# Patient Record
Sex: MALE | Race: OTHER | HISPANIC OR LATINO
[De-identification: names, ages, dates, MRNs, and addresses within clinical notes are randomized per-mention and may not be internally consistent; named-entity substitution may affect disease eponyms.]

---

## 2017-10-31 ENCOUNTER — APPOINTMENT (OUTPATIENT)
Dept: INTERNAL MEDICINE | Facility: CLINIC | Age: 42
End: 2017-10-31

## 2017-11-15 ENCOUNTER — OUTPATIENT (OUTPATIENT)
Dept: OUTPATIENT SERVICES | Facility: HOSPITAL | Age: 42
LOS: 1 days | End: 2017-11-15
Payer: COMMERCIAL

## 2017-11-15 ENCOUNTER — APPOINTMENT (OUTPATIENT)
Dept: INTERNAL MEDICINE | Facility: CLINIC | Age: 42
End: 2017-11-15
Payer: MEDICAID

## 2017-11-15 ENCOUNTER — LABORATORY RESULT (OUTPATIENT)
Age: 42
End: 2017-11-15

## 2017-11-15 VITALS
WEIGHT: 167 LBS | OXYGEN SATURATION: 97 % | SYSTOLIC BLOOD PRESSURE: 130 MMHG | HEART RATE: 84 BPM | DIASTOLIC BLOOD PRESSURE: 80 MMHG

## 2017-11-15 DIAGNOSIS — Z82.49 FAMILY HISTORY OF ISCHEMIC HEART DISEASE AND OTHER DISEASES OF THE CIRCULATORY SYSTEM: ICD-10-CM

## 2017-11-15 DIAGNOSIS — I10 ESSENTIAL (PRIMARY) HYPERTENSION: ICD-10-CM

## 2017-11-15 DIAGNOSIS — Z83.3 FAMILY HISTORY OF DIABETES MELLITUS: ICD-10-CM

## 2017-11-15 LAB
HCT VFR BLD CALC: 45.3 % — SIGNIFICANT CHANGE UP (ref 39–50)
HGB BLD-MCNC: 14.7 G/DL — SIGNIFICANT CHANGE UP (ref 13–17)
MCHC RBC-ENTMCNC: 28.4 PG — SIGNIFICANT CHANGE UP (ref 27–34)
MCHC RBC-ENTMCNC: 32.5 GM/DL — SIGNIFICANT CHANGE UP (ref 32–36)
MCV RBC AUTO: 87.5 FL — SIGNIFICANT CHANGE UP (ref 80–100)
PLATELET # BLD AUTO: 233 K/UL — SIGNIFICANT CHANGE UP (ref 150–400)
RBC # BLD: 5.18 M/UL — SIGNIFICANT CHANGE UP (ref 4.2–5.8)
RBC # FLD: 14 % — SIGNIFICANT CHANGE UP (ref 10.3–14.5)
WBC # BLD: 7.25 K/UL — SIGNIFICANT CHANGE UP (ref 3.8–10.5)
WBC # FLD AUTO: 7.25 K/UL — SIGNIFICANT CHANGE UP (ref 3.8–10.5)

## 2017-11-15 PROCEDURE — G0463: CPT

## 2017-11-15 PROCEDURE — 83036 HEMOGLOBIN GLYCOSYLATED A1C: CPT

## 2017-11-15 PROCEDURE — 85027 COMPLETE CBC AUTOMATED: CPT

## 2017-11-15 PROCEDURE — 86765 RUBEOLA ANTIBODY: CPT

## 2017-11-15 PROCEDURE — 99203 OFFICE O/P NEW LOW 30 MIN: CPT | Mod: GE

## 2017-11-15 PROCEDURE — 86735 MUMPS ANTIBODY: CPT

## 2017-11-15 PROCEDURE — 90688 IIV4 VACCINE SPLT 0.5 ML IM: CPT

## 2017-11-15 PROCEDURE — 86762 RUBELLA ANTIBODY: CPT

## 2017-11-15 PROCEDURE — 80061 LIPID PANEL: CPT

## 2017-11-15 PROCEDURE — 83520 IMMUNOASSAY QUANT NOS NONAB: CPT

## 2017-11-15 PROCEDURE — 80053 COMPREHEN METABOLIC PANEL: CPT

## 2017-11-15 PROCEDURE — G0008: CPT

## 2017-11-16 LAB
ALBUMIN SERPL ELPH-MCNC: 4.8 G/DL — SIGNIFICANT CHANGE UP (ref 3.3–5)
ALP SERPL-CCNC: 65 U/L — SIGNIFICANT CHANGE UP (ref 40–120)
ALT FLD-CCNC: 18 U/L — SIGNIFICANT CHANGE UP (ref 10–45)
ANION GAP SERPL CALC-SCNC: 15 MMOL/L — SIGNIFICANT CHANGE UP (ref 5–17)
AST SERPL-CCNC: 21 U/L — SIGNIFICANT CHANGE UP (ref 10–40)
BILIRUB SERPL-MCNC: 0.6 MG/DL — SIGNIFICANT CHANGE UP (ref 0.2–1.2)
BUN SERPL-MCNC: 19 MG/DL — SIGNIFICANT CHANGE UP (ref 7–23)
CALCIUM SERPL-MCNC: 9.4 MG/DL — SIGNIFICANT CHANGE UP (ref 8.4–10.5)
CHLORIDE SERPL-SCNC: 103 MMOL/L — SIGNIFICANT CHANGE UP (ref 96–108)
CHOLEST SERPL-MCNC: 175 MG/DL — SIGNIFICANT CHANGE UP (ref 10–199)
CO2 SERPL-SCNC: 25 MMOL/L — SIGNIFICANT CHANGE UP (ref 22–31)
CREAT SERPL-MCNC: 0.79 MG/DL — SIGNIFICANT CHANGE UP (ref 0.5–1.3)
GLUCOSE SERPL-MCNC: 92 MG/DL — SIGNIFICANT CHANGE UP (ref 70–99)
HBA1C BLD-MCNC: 5.4 % — SIGNIFICANT CHANGE UP (ref 4–5.6)
HDLC SERPL-MCNC: 38 MG/DL — LOW (ref 40–125)
LIPID PNL WITH DIRECT LDL SERPL: 121 MG/DL — SIGNIFICANT CHANGE UP
MEV IGG SER-ACNC: 22 AU/ML — SIGNIFICANT CHANGE UP
MEV IGG+IGM SER-IMP: NEGATIVE — SIGNIFICANT CHANGE UP
MUV AB SER-ACNC: POSITIVE — SIGNIFICANT CHANGE UP
MUV IGG FLD-ACNC: 134 AU/ML — SIGNIFICANT CHANGE UP
POTASSIUM SERPL-MCNC: 4.4 MMOL/L — SIGNIFICANT CHANGE UP (ref 3.5–5.3)
POTASSIUM SERPL-SCNC: 4.4 MMOL/L — SIGNIFICANT CHANGE UP (ref 3.5–5.3)
PROT SERPL-MCNC: 7.7 G/DL — SIGNIFICANT CHANGE UP (ref 6–8.3)
RUBV IGG SER-ACNC: 12.4 INDEX — SIGNIFICANT CHANGE UP
RUBV IGG SER-IMP: POSITIVE — SIGNIFICANT CHANGE UP
SODIUM SERPL-SCNC: 143 MMOL/L — SIGNIFICANT CHANGE UP (ref 135–145)
TOTAL CHOLESTEROL/HDL RATIO MEASUREMENT: 4.6 RATIO — SIGNIFICANT CHANGE UP (ref 3.4–9.6)
TRIGL SERPL-MCNC: 81 MG/DL — SIGNIFICANT CHANGE UP (ref 10–149)

## 2017-11-17 LAB — TSH RECEP AB FLD-ACNC: <0.5 IU/L — SIGNIFICANT CHANGE UP (ref 0–1.75)

## 2017-11-22 DIAGNOSIS — I49.5 SICK SINUS SYNDROME: ICD-10-CM

## 2017-11-22 DIAGNOSIS — Z82.49 FAMILY HISTORY OF ISCHEMIC HEART DISEASE AND OTHER DISEASES OF THE CIRCULATORY SYSTEM: ICD-10-CM

## 2017-11-22 DIAGNOSIS — Z23 ENCOUNTER FOR IMMUNIZATION: ICD-10-CM

## 2017-11-22 DIAGNOSIS — F84.0 AUTISTIC DISORDER: ICD-10-CM

## 2017-11-22 DIAGNOSIS — Z83.3 FAMILY HISTORY OF DIABETES MELLITUS: ICD-10-CM

## 2018-01-11 ENCOUNTER — APPOINTMENT (OUTPATIENT)
Dept: INTERNAL MEDICINE | Facility: CLINIC | Age: 43
End: 2018-01-11
Payer: MEDICAID

## 2018-01-11 VITALS
DIASTOLIC BLOOD PRESSURE: 80 MMHG | OXYGEN SATURATION: 98 % | HEART RATE: 95 BPM | WEIGHT: 164 LBS | SYSTOLIC BLOOD PRESSURE: 110 MMHG

## 2018-01-11 PROCEDURE — 99213 OFFICE O/P EST LOW 20 MIN: CPT | Mod: GE

## 2018-01-17 ENCOUNTER — NON-APPOINTMENT (OUTPATIENT)
Age: 43
End: 2018-01-17

## 2018-01-17 ENCOUNTER — APPOINTMENT (OUTPATIENT)
Dept: ELECTROPHYSIOLOGY | Facility: CLINIC | Age: 43
End: 2018-01-17
Payer: MEDICAID

## 2018-01-17 ENCOUNTER — OUTPATIENT (OUTPATIENT)
Dept: OUTPATIENT SERVICES | Facility: HOSPITAL | Age: 43
LOS: 1 days | End: 2018-01-17
Payer: COMMERCIAL

## 2018-01-17 ENCOUNTER — APPOINTMENT (OUTPATIENT)
Dept: CARDIOLOGY | Facility: HOSPITAL | Age: 43
End: 2018-01-17

## 2018-01-17 VITALS
OXYGEN SATURATION: 97 % | DIASTOLIC BLOOD PRESSURE: 78 MMHG | SYSTOLIC BLOOD PRESSURE: 107 MMHG | HEART RATE: 80 BPM | WEIGHT: 164 LBS

## 2018-01-17 DIAGNOSIS — I25.10 ATHEROSCLEROTIC HEART DISEASE OF NATIVE CORONARY ARTERY WITHOUT ANGINA PECTORIS: ICD-10-CM

## 2018-01-17 DIAGNOSIS — Z00.00 ENCOUNTER FOR GENERAL ADULT MEDICAL EXAMINATION WITHOUT ABNORMAL FINDINGS: ICD-10-CM

## 2018-01-17 PROCEDURE — 93279 PRGRMG DEV EVAL PM/LDLS PM: CPT

## 2018-01-17 PROCEDURE — G0463: CPT

## 2018-01-17 PROCEDURE — 93005 ELECTROCARDIOGRAM TRACING: CPT

## 2018-01-18 DIAGNOSIS — I49.9 CARDIAC ARRHYTHMIA, UNSPECIFIED: ICD-10-CM

## 2018-01-18 LAB
BASOPHILS # BLD AUTO: 0.04 K/UL
BASOPHILS NFR BLD AUTO: 0.5 %
CHOLEST SERPL-MCNC: 182 MG/DL
CHOLEST/HDLC SERPL: 5.1 RATIO
EOSINOPHIL # BLD AUTO: 0.1 K/UL
EOSINOPHIL NFR BLD AUTO: 1.2 %
HBA1C MFR BLD HPLC: 5.5 %
HCT VFR BLD CALC: 44.9 %
HDLC SERPL-MCNC: 36 MG/DL
HGB BLD-MCNC: 14.5 G/DL
IMM GRANULOCYTES NFR BLD AUTO: 0.2 %
LDLC SERPL CALC-MCNC: 124 MG/DL
LYMPHOCYTES # BLD AUTO: 1.69 K/UL
LYMPHOCYTES NFR BLD AUTO: 20.6 %
MAN DIFF?: NORMAL
MCHC RBC-ENTMCNC: 27.9 PG
MCHC RBC-ENTMCNC: 32.3 GM/DL
MCV RBC AUTO: 86.3 FL
MONOCYTES # BLD AUTO: 0.52 K/UL
MONOCYTES NFR BLD AUTO: 6.3 %
NEUTROPHILS # BLD AUTO: 5.82 K/UL
NEUTROPHILS NFR BLD AUTO: 71.2 %
PLATELET # BLD AUTO: 333 K/UL
RBC # BLD: 5.2 M/UL
RBC # FLD: 13.6 %
TRIGL SERPL-MCNC: 110 MG/DL
WBC # FLD AUTO: 8.19 K/UL

## 2018-01-26 ENCOUNTER — OUTPATIENT (OUTPATIENT)
Dept: OUTPATIENT SERVICES | Facility: HOSPITAL | Age: 43
LOS: 1 days | End: 2018-01-26
Payer: COMMERCIAL

## 2018-01-26 ENCOUNTER — APPOINTMENT (OUTPATIENT)
Dept: CV DIAGNOSITCS | Facility: HOSPITAL | Age: 43
End: 2018-01-26

## 2018-01-26 DIAGNOSIS — Z00.00 ENCOUNTER FOR GENERAL ADULT MEDICAL EXAMINATION WITHOUT ABNORMAL FINDINGS: ICD-10-CM

## 2018-01-26 PROCEDURE — 93306 TTE W/DOPPLER COMPLETE: CPT | Mod: 26

## 2018-01-26 PROCEDURE — 93306 TTE W/DOPPLER COMPLETE: CPT

## 2018-02-14 ENCOUNTER — RESULT REVIEW (OUTPATIENT)
Age: 43
End: 2018-02-14

## 2018-02-14 LAB
ALBUMIN SERPL ELPH-MCNC: 4.5 G/DL
ALP BLD-CCNC: 65 U/L
ALT SERPL-CCNC: 18 U/L
ANION GAP SERPL CALC-SCNC: 14 MMOL/L
AST SERPL-CCNC: 24 U/L
BILIRUB SERPL-MCNC: 0.5 MG/DL
BUN SERPL-MCNC: 17 MG/DL
CALCIUM SERPL-MCNC: 9.7 MG/DL
CHLORIDE SERPL-SCNC: 101 MMOL/L
CO2 SERPL-SCNC: 26 MMOL/L
CREAT SERPL-MCNC: 1.11 MG/DL
GLUCOSE SERPL-MCNC: 105 MG/DL
POTASSIUM SERPL-SCNC: 3.9 MMOL/L
PROT SERPL-MCNC: 7.8 G/DL
SODIUM SERPL-SCNC: 141 MMOL/L

## 2018-04-11 ENCOUNTER — OUTPATIENT (OUTPATIENT)
Dept: OUTPATIENT SERVICES | Facility: HOSPITAL | Age: 43
LOS: 1 days | End: 2018-04-11
Payer: COMMERCIAL

## 2018-04-11 ENCOUNTER — APPOINTMENT (OUTPATIENT)
Dept: ELECTROPHYSIOLOGY | Facility: CLINIC | Age: 43
End: 2018-04-11

## 2018-04-11 ENCOUNTER — APPOINTMENT (OUTPATIENT)
Dept: CARDIOLOGY | Facility: HOSPITAL | Age: 43
End: 2018-04-11

## 2018-04-11 VITALS — DIASTOLIC BLOOD PRESSURE: 72 MMHG | OXYGEN SATURATION: 98 % | HEART RATE: 71 BPM | SYSTOLIC BLOOD PRESSURE: 111 MMHG

## 2018-04-11 DIAGNOSIS — I25.10 ATHEROSCLEROTIC HEART DISEASE OF NATIVE CORONARY ARTERY WITHOUT ANGINA PECTORIS: ICD-10-CM

## 2018-04-11 PROCEDURE — G0463: CPT

## 2018-04-12 DIAGNOSIS — I49.5 SICK SINUS SYNDROME: ICD-10-CM

## 2018-04-18 ENCOUNTER — APPOINTMENT (OUTPATIENT)
Dept: ELECTROPHYSIOLOGY | Facility: CLINIC | Age: 43
End: 2018-04-18
Payer: COMMERCIAL

## 2018-04-18 ENCOUNTER — NON-APPOINTMENT (OUTPATIENT)
Age: 43
End: 2018-04-18

## 2018-04-18 VITALS — HEART RATE: 84 BPM | SYSTOLIC BLOOD PRESSURE: 111 MMHG | OXYGEN SATURATION: 99 % | DIASTOLIC BLOOD PRESSURE: 68 MMHG

## 2018-04-18 PROCEDURE — 99203 OFFICE O/P NEW LOW 30 MIN: CPT

## 2018-04-18 PROCEDURE — 93000 ELECTROCARDIOGRAM COMPLETE: CPT

## 2018-04-18 RX ORDER — BENZONATATE 100 MG/1
100 CAPSULE ORAL
Qty: 30 | Refills: 0 | Status: DISCONTINUED | COMMUNITY
Start: 2018-01-11 | End: 2018-04-18

## 2018-04-24 ENCOUNTER — APPOINTMENT (OUTPATIENT)
Dept: OPHTHALMOLOGY | Facility: CLINIC | Age: 43
End: 2018-04-24
Payer: COMMERCIAL

## 2018-04-24 DIAGNOSIS — H11.003 UNSPECIFIED PTERYGIUM OF EYE, BILATERAL: ICD-10-CM

## 2018-04-24 PROCEDURE — 92285 EXTERNAL OCULAR PHOTOGRAPHY: CPT

## 2018-04-24 PROCEDURE — 99204 OFFICE O/P NEW MOD 45 MIN: CPT

## 2018-05-17 ENCOUNTER — MEDICATION RENEWAL (OUTPATIENT)
Age: 43
End: 2018-05-17

## 2018-05-18 ENCOUNTER — NON-APPOINTMENT (OUTPATIENT)
Age: 43
End: 2018-05-18

## 2018-05-18 ENCOUNTER — APPOINTMENT (OUTPATIENT)
Dept: ELECTROPHYSIOLOGY | Facility: CLINIC | Age: 43
End: 2018-05-18
Payer: COMMERCIAL

## 2018-05-18 VITALS — SYSTOLIC BLOOD PRESSURE: 112 MMHG | OXYGEN SATURATION: 100 % | HEART RATE: 76 BPM | DIASTOLIC BLOOD PRESSURE: 71 MMHG

## 2018-05-18 PROCEDURE — 93000 ELECTROCARDIOGRAM COMPLETE: CPT | Mod: 59

## 2018-05-18 PROCEDURE — 99213 OFFICE O/P EST LOW 20 MIN: CPT

## 2018-05-18 PROCEDURE — 93279 PRGRMG DEV EVAL PM/LDLS PM: CPT

## 2018-05-18 RX ORDER — PROPAFENONE HYDROCHLORIDE 150 MG/1
150 TABLET, FILM COATED ORAL DAILY
Qty: 30 | Refills: 0 | Status: DISCONTINUED | COMMUNITY
Start: 2017-11-15 | End: 2018-05-18

## 2018-11-14 ENCOUNTER — NON-APPOINTMENT (OUTPATIENT)
Age: 43
End: 2018-11-14

## 2018-11-14 ENCOUNTER — OUTPATIENT (OUTPATIENT)
Dept: OUTPATIENT SERVICES | Facility: HOSPITAL | Age: 43
LOS: 1 days | End: 2018-11-14
Payer: COMMERCIAL

## 2018-11-14 ENCOUNTER — APPOINTMENT (OUTPATIENT)
Dept: CARDIOLOGY | Facility: HOSPITAL | Age: 43
End: 2018-11-14

## 2018-11-14 VITALS
WEIGHT: 163.14 LBS | DIASTOLIC BLOOD PRESSURE: 74 MMHG | HEART RATE: 70 BPM | OXYGEN SATURATION: 97 % | SYSTOLIC BLOOD PRESSURE: 116 MMHG

## 2018-11-14 DIAGNOSIS — R00.2 PALPITATIONS: ICD-10-CM

## 2018-11-14 DIAGNOSIS — I25.10 ATHEROSCLEROTIC HEART DISEASE OF NATIVE CORONARY ARTERY WITHOUT ANGINA PECTORIS: ICD-10-CM

## 2018-11-14 PROCEDURE — 93005 ELECTROCARDIOGRAM TRACING: CPT

## 2018-11-14 PROCEDURE — G0463: CPT

## 2018-11-14 NOTE — PHYSICAL EXAM
[General Appearance - Well Developed] : well developed [General Appearance - In No Acute Distress] : no acute distress [Normal Conjunctiva] : the conjunctiva exhibited no abnormalities [Normal Oral Mucosa] : normal oral mucosa [No Oral Pallor] : no oral pallor [Normal Jugular Venous A Waves Present] : normal jugular venous A waves present [Normal Jugular Venous V Waves Present] : normal jugular venous V waves present [No Jugular Venous Arias A Waves] : no jugular venous arias A waves [Auscultation Breath Sounds / Voice Sounds] : lungs were clear to auscultation bilaterally [Heart Rate And Rhythm] : heart rate and rhythm were normal [Heart Sounds] : normal S1 and S2 [Murmurs] : no murmurs present [Bowel Sounds] : normal bowel sounds [Abdomen Tenderness] : non-tender [Abnormal Walk] : normal gait [] : no rash

## 2018-11-15 DIAGNOSIS — I49.5 SICK SINUS SYNDROME: ICD-10-CM

## 2018-11-15 DIAGNOSIS — R00.2 PALPITATIONS: ICD-10-CM

## 2018-11-15 NOTE — DISCUSSION/SUMMARY
[FreeTextEntry1] : 41 yo man w/ hx of SSS s/p single lead PPM 2009 (biotronik, in [facility]), autism/mental retardation (since birth) who presents for follow up. Seen by Dr. Ramos (EP attending), and placed on BB therapy w/ improvement of palpitations.  TTE on 1/2018 w/ preserved LV function; Holter w/ no evidence of arrhythmias, no hx of afib as per family. \par \par PPM interrogated today:  Pt RV pacing 35%, good thresholds/impedance/sensitivity.  Battery LARY 11 months. \par \par Plan\par - cont Toprol XL 12.5 mg daily \par - obtain TTE in 3 months to re-evaluate LV function \par - repeat PPM interrogation in 3-6 months \par \par RTC in 3 months w/ TTE results

## 2018-11-15 NOTE — HISTORY OF PRESENT ILLNESS
[FreeTextEntry1] : 43 yo man w/ hx of SSS s/p PPM 2009 (back in [facility]), autism/mental retardation (since birth) who presents for follow up. History primarily obtained from sister as patient does not speak. \par \par Patient had tachybrady syndrome requiring PPM in 2009.  Amber unclear reasons, received a single lead ppm (biotronik).  Referred to me on 1/2018 for initial evaluation.  Underwent PPM interrogation and found to be in sinus w/ V-pacing ~ 43% of the time.  TTE performed w/ preserved LV function.  Holter monitor w/ no evidence of arrhythmias.  Seen by Dr. Ramos (EP attending), placed on BB therapy and VVI back up rate decreased to 50 bpm.     \par \par Palpitations since then have resolved.  No other new/acute complaints.

## 2018-11-16 ENCOUNTER — APPOINTMENT (OUTPATIENT)
Dept: INTERNAL MEDICINE | Facility: CLINIC | Age: 43
End: 2018-11-16

## 2018-11-16 ENCOUNTER — APPOINTMENT (OUTPATIENT)
Dept: ELECTROPHYSIOLOGY | Facility: CLINIC | Age: 43
End: 2018-11-16

## 2019-01-14 ENCOUNTER — LABORATORY RESULT (OUTPATIENT)
Age: 44
End: 2019-01-14

## 2019-01-14 ENCOUNTER — OUTPATIENT (OUTPATIENT)
Dept: OUTPATIENT SERVICES | Facility: HOSPITAL | Age: 44
LOS: 1 days | End: 2019-01-14
Payer: COMMERCIAL

## 2019-01-14 ENCOUNTER — APPOINTMENT (OUTPATIENT)
Dept: INTERNAL MEDICINE | Facility: CLINIC | Age: 44
End: 2019-01-14
Payer: COMMERCIAL

## 2019-01-14 VITALS
SYSTOLIC BLOOD PRESSURE: 112 MMHG | WEIGHT: 160 LBS | BODY MASS INDEX: 25.71 KG/M2 | DIASTOLIC BLOOD PRESSURE: 70 MMHG | HEIGHT: 66 IN

## 2019-01-14 DIAGNOSIS — I10 ESSENTIAL (PRIMARY) HYPERTENSION: ICD-10-CM

## 2019-01-14 DIAGNOSIS — J06.9 ACUTE UPPER RESPIRATORY INFECTION, UNSPECIFIED: ICD-10-CM

## 2019-01-14 PROCEDURE — 90656 IIV3 VACC NO PRSV 0.5 ML IM: CPT

## 2019-01-14 PROCEDURE — 84443 ASSAY THYROID STIM HORMONE: CPT

## 2019-01-14 PROCEDURE — 99204 OFFICE O/P NEW MOD 45 MIN: CPT | Mod: GC

## 2019-01-14 PROCEDURE — G0008: CPT

## 2019-01-14 PROCEDURE — 99214 OFFICE O/P EST MOD 30 MIN: CPT | Mod: GC

## 2019-01-14 PROCEDURE — G0463: CPT

## 2019-01-14 PROCEDURE — 80048 BASIC METABOLIC PNL TOTAL CA: CPT

## 2019-01-14 NOTE — HEALTH RISK ASSESSMENT
[Good] : ~his/her~  mood as  good [No falls in past year] : Patient reported no falls in the past year [0] : 2) Feeling down, depressed, or hopeless: Not at all (0) [Language] : difficulty with language [Behavior] : difficulty with behavior [Learning/Retaining New Information] : difficulty learning/retaining new information [Handling Complex Tasks] : difficulty handling complex tasks [Reasoning] : difficulty with reasoning [Spatial Ability and Orientation] : difficulty with spatial ability and orientation [Less Than High School] : less than high school [] : No [[id number]] : 0

## 2019-01-14 NOTE — ASSESSMENT
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 \par \par ###Mental Disability: Genetics referral given. Patient has some features of autism. Sister reports that her mother was in labor with him for quite a while before she delivered-could have some degree of anoxic brain injury? Sister with MR as well. Marfanoid features as well. Genetic testing indicated. \par \par ###Cardiomyopathy s/p PPM: Unclear why he received PPM. Cardiology documented tachy/yolande syndrome. V paced 43% of the time. c/w current meds for BP\par \par ###HCM: TDAP in 2016. Influenza vaccine given in LUE. Routine labs ordered.\par Follow up prn\par \par PT seen and d/w Dr. Red

## 2019-01-14 NOTE — PHYSICAL EXAM
[No Acute Distress] : no acute distress [Well Nourished] : well nourished [Normal Sclera/Conjunctiva] : normal sclera/conjunctiva [PERRL] : pupils equal round and reactive to light [EOMI] : extraocular movements intact [Normal Outer Ear/Nose] : the outer ears and nose were normal in appearance [Normal Oropharynx] : the oropharynx was normal [No JVD] : no jugular venous distention [Supple] : supple [No Lymphadenopathy] : no lymphadenopathy [Thyroid Normal, No Nodules] : the thyroid was normal and there were no nodules present [No Respiratory Distress] : no respiratory distress  [Clear to Auscultation] : lungs were clear to auscultation bilaterally [No Accessory Muscle Use] : no accessory muscle use [Normal Rate] : normal rate  [Regular Rhythm] : with a regular rhythm [Normal S1, S2] : normal S1 and S2 [No Murmur] : no murmur heard [No Carotid Bruits] : no carotid bruits [No Abdominal Bruit] : a ~M bruit was not heard ~T in the abdomen [No Varicosities] : no varicosities [No Edema] : there was no peripheral edema [No Extremity Clubbing/Cyanosis] : no extremity clubbing/cyanosis [No Palpable Aorta] : no palpable aorta [Soft] : abdomen soft [Non Tender] : non-tender [Non-distended] : non-distended [No Masses] : no abdominal mass palpated [No HSM] : no HSM [Normal Bowel Sounds] : normal bowel sounds [No Hernias] : no hernias [Normal Supraclavicular Nodes] : no supraclavicular lymphadenopathy [Normal Axillary Nodes] : no axillary lymphadenopathy [Normal Posterior Cervical Nodes] : no posterior cervical lymphadenopathy [Normal Femoral Nodes] : no femoral lymphadenopathy [Normal Anterior Cervical Nodes] : no anterior cervical lymphadenopathy [Normal Inguinal Nodes] : no inguinal lymphadenopathy [No CVA Tenderness] : no CVA  tenderness [No Spinal Tenderness] : no spinal tenderness [No Joint Swelling] : no joint swelling [Grossly Normal Strength/Tone] : grossly normal strength/tone [No Rash] : no rash [Normal Gait] : normal gait [Coordination Grossly Intact] : coordination grossly intact [No Focal Deficits] : no focal deficits [Speech Grossly Normal] : speech grossly normal [Memory Grossly Normal] : memory grossly normal [Normal Affect] : the affect was normal [Normal Mood] : the mood was normal [Normal Insight/Judgement] : insight and judgment were intact [de-identified] : Chin retraction noted (small lower jaw) [de-identified] : Everted wrists and hands. Fingers disproportionately long compared to rest of body habitus

## 2019-01-14 NOTE — HISTORY OF PRESENT ILLNESS
[FreeTextEntry1] : CPE [de-identified] : Patient is a 43 years old male with PPM (for tachy yolande syndrome as per cardiology note?) and MR who presents to \[facility]\"" care. Patient is non verbal at baseline and is accompanied by his sister who provided history. Patient was born at term in [facility] but was born with facial deformity, everted wrists and feet, along with disproportionately large hands. He has another sister with similar issues who is short in stature. Sister's goal is to find out what condition her brother has as her was never definitively diagnosed in [facility]. He communicates with gestures and can make his needs known with visual cues.His hearing seems to be intact. He does not like changing his environment and does not get more than 2 hours of sleep at a time.

## 2019-01-15 LAB
ANION GAP SERPL CALC-SCNC: 13 MMOL/L — SIGNIFICANT CHANGE UP (ref 5–17)
BASOPHILS # BLD AUTO: 0.03 K/UL
BASOPHILS NFR BLD AUTO: 0.5 %
BUN SERPL-MCNC: 18 MG/DL — SIGNIFICANT CHANGE UP (ref 7–23)
CALCIUM SERPL-MCNC: 10.1 MG/DL — SIGNIFICANT CHANGE UP (ref 8.4–10.5)
CHLORIDE SERPL-SCNC: 104 MMOL/L — SIGNIFICANT CHANGE UP (ref 96–108)
CO2 SERPL-SCNC: 23 MMOL/L — SIGNIFICANT CHANGE UP (ref 22–31)
CREAT SERPL-MCNC: 0.94 MG/DL — SIGNIFICANT CHANGE UP (ref 0.5–1.3)
EOSINOPHIL # BLD AUTO: 0.16 K/UL
EOSINOPHIL NFR BLD AUTO: 2.5 %
GLUCOSE SERPL-MCNC: 91 MG/DL — SIGNIFICANT CHANGE UP (ref 70–99)
HCT VFR BLD CALC: 45.4 %
HGB BLD-MCNC: 14.9 G/DL
IMM GRANULOCYTES NFR BLD AUTO: 0.3 %
LYMPHOCYTES # BLD AUTO: 1.6 K/UL
LYMPHOCYTES NFR BLD AUTO: 24.7 %
MAN DIFF?: NORMAL
MCHC RBC-ENTMCNC: 28 PG
MCHC RBC-ENTMCNC: 32.8 GM/DL
MCV RBC AUTO: 85.2 FL
MONOCYTES # BLD AUTO: 0.43 K/UL
MONOCYTES NFR BLD AUTO: 6.6 %
NEUTROPHILS # BLD AUTO: 4.23 K/UL
NEUTROPHILS NFR BLD AUTO: 65.4 %
PLATELET # BLD AUTO: 251 K/UL
POTASSIUM SERPL-MCNC: 4.8 MMOL/L — SIGNIFICANT CHANGE UP (ref 3.5–5.3)
POTASSIUM SERPL-SCNC: 4.8 MMOL/L — SIGNIFICANT CHANGE UP (ref 3.5–5.3)
RBC # BLD: 5.33 M/UL
RBC # FLD: 13.8 %
SODIUM SERPL-SCNC: 140 MMOL/L — SIGNIFICANT CHANGE UP (ref 135–145)
T4 FREE+ TSH PNL SERPL: 0.91 UIU/ML — SIGNIFICANT CHANGE UP (ref 0.27–4.2)
WBC # FLD AUTO: 6.47 K/UL

## 2019-01-24 DIAGNOSIS — Z95.0 PRESENCE OF CARDIAC PACEMAKER: ICD-10-CM

## 2019-01-24 DIAGNOSIS — F84.0 AUTISTIC DISORDER: ICD-10-CM

## 2019-01-24 DIAGNOSIS — J06.9 ACUTE UPPER RESPIRATORY INFECTION, UNSPECIFIED: ICD-10-CM

## 2019-01-29 ENCOUNTER — LABORATORY RESULT (OUTPATIENT)
Age: 44
End: 2019-01-29

## 2019-01-29 ENCOUNTER — APPOINTMENT (OUTPATIENT)
Dept: PEDIATRIC MEDICAL GENETICS | Facility: CLINIC | Age: 44
End: 2019-01-29
Payer: COMMERCIAL

## 2019-01-29 DIAGNOSIS — Q38.1 ANKYLOGLOSSIA: ICD-10-CM

## 2019-01-29 DIAGNOSIS — R48.8 OTHER SYMBOLIC DYSFUNCTIONS: ICD-10-CM

## 2019-01-29 PROCEDURE — 99244 OFF/OP CNSLTJ NEW/EST MOD 40: CPT

## 2019-01-29 NOTE — REASON FOR VISIT
[Initial - Scheduled] : [unfilled]  is being seen for  ~M an initial scheduled visit [Family Member] : family member

## 2019-02-20 ENCOUNTER — APPOINTMENT (OUTPATIENT)
Dept: CARDIOLOGY | Facility: HOSPITAL | Age: 44
End: 2019-02-20

## 2019-02-20 ENCOUNTER — OUTPATIENT (OUTPATIENT)
Dept: OUTPATIENT SERVICES | Facility: HOSPITAL | Age: 44
LOS: 1 days | End: 2019-02-20
Payer: COMMERCIAL

## 2019-02-20 VITALS
OXYGEN SATURATION: 98 % | BODY MASS INDEX: 26.03 KG/M2 | SYSTOLIC BLOOD PRESSURE: 126 MMHG | HEIGHT: 66 IN | HEART RATE: 107 BPM | WEIGHT: 162 LBS | DIASTOLIC BLOOD PRESSURE: 80 MMHG

## 2019-02-20 DIAGNOSIS — E78.5 HYPERLIPIDEMIA, UNSPECIFIED: ICD-10-CM

## 2019-02-20 DIAGNOSIS — I25.10 ATHEROSCLEROTIC HEART DISEASE OF NATIVE CORONARY ARTERY WITHOUT ANGINA PECTORIS: ICD-10-CM

## 2019-02-20 DIAGNOSIS — I49.5 SICK SINUS SYNDROME: ICD-10-CM

## 2019-02-20 DIAGNOSIS — R00.2 PALPITATIONS: ICD-10-CM

## 2019-02-20 PROCEDURE — G0463: CPT

## 2019-02-20 PROCEDURE — 93005 ELECTROCARDIOGRAM TRACING: CPT

## 2019-02-25 NOTE — DISCUSSION/SUMMARY
[FreeTextEntry1] : 41 yo man w/ hx of SSS s/p single lead PPM 2009 ([employer], in [facility]), autism/mental retardation (since birth) who presents for follow up. Seen by Dr. Ramos (EP attending), and placed on BB therapy w/ improvement of palpitations.  TTE on 1/2018 w/ preserved LV function; Holter w/ no evidence of arrhythmias, no hx of afib as per family. \par \par PPM interrogated 11/2018:  RV pacing ~ 35%, good thresholds/impedance/sensitivity.  Battery LARY 11 months. \par \par Plan\par - cont Toprol XL 12.5 mg daily \par - obtain TTE  prior to next visit to re-evaluate LV function \par - repeat PPM interrogation on 5/2019 \par \par RTC in 6 months w/ TTE results

## 2019-02-25 NOTE — HISTORY OF PRESENT ILLNESS
[FreeTextEntry1] : 41 yo man w/ hx of SSS s/p PPM 2009 (back in [facility]), cognitive delay (since birth) who presents for follow up. History primarily obtained from sister as patient does not speak. \par \par Patient has had tachybrady syndrome requiring PPM in 2009.  Amber unclear reasons, received a single lead ppm (biotronik).  Referred to me on 1/2018 for initial evaluation.  Underwent PPM interrogation and found to be in sinus w/ V-pacing ~ 43% of the time.  TTE performed w/ preserved LV function.  Holter monitor w/ no evidence of arrhythmias.  Seen by Dr. Ramos (EP attending), placed on BB therapy and VVI back up rate decreased to 50 bpm; last interrogation 11/2018 showed V pacing ~ 35%.  Current on BB therapy; tolerating it well.  \par \par Currently undergoing genetic test as does have family hx of developmental delays (sister by mother's side as well as uncle by father's side).  Sister states that he's been very combative lately/agitated for unclear reasons.  \par \par From a cardiac perspective, palpitations since then have resolved.  No other new/acute complaints.  \par \par

## 2019-04-10 ENCOUNTER — APPOINTMENT (OUTPATIENT)
Dept: PEDIATRIC MEDICAL GENETICS | Facility: CLINIC | Age: 44
End: 2019-04-10
Payer: COMMERCIAL

## 2019-04-10 PROCEDURE — 99214 OFFICE O/P EST MOD 30 MIN: CPT

## 2019-04-22 NOTE — FAMILY HISTORY
[FreeTextEntry1] : The patient is the child of non-consanguineous parents of [place] descent. He has 5 full sisters. One of his sisters also has intellectual disability and is non-verbal. This sister has difficulty with walking, reportedly due to genu valgum. This sister also has tantrums and has a habit of scratching herself. She is reportedly nondysmorphic but has slightly lower cognitive ability.

## 2019-04-22 NOTE — CONSULT LETTER
[Dear  ___] : Dear  [unfilled], [Consult Letter:] : I had the pleasure of evaluating your patient, [unfilled]. [Please see my note below.] : Please see my note below. [Consult Closing:] : Thank you very much for allowing me to participate in the care of this patient.  If you have any questions, please do not hesitate to contact me. [Sincerely,] : Sincerely, [FreeTextEntry2] : Dc Sow [FreeTextEntry3] : Yasmin Vernon MD\par Clinical

## 2019-04-22 NOTE — PHYSICAL EXAM
[Romberg Sign] : Romberg sign was present [Normal] : normal palmar creases without syndactyly or other anomalies [Cranial Nerves] : cranial nerves are normal [Muscle tone/ strength] : muscle tone/ strength is normal [de-identified] : easily distracted during physical exam [de-identified] : xanthelasmata around the eyes [de-identified] : micrognathia [de-identified] : unable to follow directions during exam [de-identified] : ankles are contracted [de-identified] : difficulty with tandem gait, ataxic.

## 2019-04-22 NOTE — HISTORY OF PRESENT ILLNESS
[FreeTextEntry1] : The patient is non-verbal with intellectually disability. He had normal motor development in childhood, but never developed speech. He has good receptive language and is able to follow 2-3 command directions. He relates to his surroundings and responds to social cues. He can sometimes have tantrums if he does nit get his way and will try to hurt himself. He does not demonstrate any aggression towards others. He is followed by Cardiology due to a diagnosis of tachy-bradycardia syndrome which was discovered incidentally during a routine physical exam. He had a pacemaker placed in ~2005. The patient has mild balance issues and occasionally trips when he walks. He has no other significant medical history.

## 2019-04-22 NOTE — BIRTH HISTORY
[FreeTextEntry1] : The patient is the product of a full term pregnancy. Pregnancy and birth histories are limited, but reportedly unremarkable.

## 2019-05-13 ENCOUNTER — APPOINTMENT (OUTPATIENT)
Dept: ELECTROPHYSIOLOGY | Facility: CLINIC | Age: 44
End: 2019-05-13
Payer: COMMERCIAL

## 2019-05-13 ENCOUNTER — APPOINTMENT (OUTPATIENT)
Dept: ELECTROPHYSIOLOGY | Facility: CLINIC | Age: 44
End: 2019-05-13

## 2019-05-13 VITALS
WEIGHT: 162 LBS | HEART RATE: 80 BPM | BODY MASS INDEX: 26.03 KG/M2 | OXYGEN SATURATION: 96 % | DIASTOLIC BLOOD PRESSURE: 78 MMHG | SYSTOLIC BLOOD PRESSURE: 119 MMHG | HEIGHT: 66 IN

## 2019-05-13 PROCEDURE — 93279 PRGRMG DEV EVAL PM/LDLS PM: CPT

## 2019-05-22 ENCOUNTER — OUTPATIENT (OUTPATIENT)
Dept: OUTPATIENT SERVICES | Facility: HOSPITAL | Age: 44
LOS: 1 days | End: 2019-05-22
Payer: COMMERCIAL

## 2019-05-22 ENCOUNTER — APPOINTMENT (OUTPATIENT)
Dept: CV DIAGNOSITCS | Facility: HOSPITAL | Age: 44
End: 2019-05-22

## 2019-05-22 ENCOUNTER — APPOINTMENT (OUTPATIENT)
Dept: CARDIOLOGY | Facility: HOSPITAL | Age: 44
End: 2019-05-22

## 2019-05-22 ENCOUNTER — NON-APPOINTMENT (OUTPATIENT)
Age: 44
End: 2019-05-22

## 2019-05-22 VITALS
OXYGEN SATURATION: 98 % | HEIGHT: 66 IN | SYSTOLIC BLOOD PRESSURE: 115 MMHG | DIASTOLIC BLOOD PRESSURE: 72 MMHG | WEIGHT: 162 LBS | BODY MASS INDEX: 26.03 KG/M2 | HEART RATE: 111 BPM

## 2019-05-22 DIAGNOSIS — I25.10 ATHEROSCLEROTIC HEART DISEASE OF NATIVE CORONARY ARTERY WITHOUT ANGINA PECTORIS: ICD-10-CM

## 2019-05-22 PROCEDURE — 93306 TTE W/DOPPLER COMPLETE: CPT | Mod: 26

## 2019-05-22 PROCEDURE — 93005 ELECTROCARDIOGRAM TRACING: CPT

## 2019-05-22 PROCEDURE — 93306 TTE W/DOPPLER COMPLETE: CPT

## 2019-05-22 PROCEDURE — G0463: CPT

## 2019-05-22 RX ORDER — METOPROLOL SUCCINATE 25 MG/1
25 TABLET, EXTENDED RELEASE ORAL DAILY
Qty: 30 | Refills: 3 | Status: DISCONTINUED | COMMUNITY
Start: 2018-04-18 | End: 2019-05-22

## 2019-05-28 NOTE — DISCUSSION/SUMMARY
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 \par Repeat TTE w/ mild LV dysfunction (EF 40-45%) which is likely pacemaker induced (RV pacing ~35-40%). \par Recent interrogation w/ ~ 2 month battery life.  \par \par Plan\par - d/c Toprol XL\par - scheduled appointment w/ Dr. Reese.  Will need device upgrade (addition of atrial lead)  \par \par RTC in 3 months

## 2019-05-28 NOTE — HISTORY OF PRESENT ILLNESS
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 History primarily obtained from sister as patient does not speak. \par \par Regarding his cardiac history:  Patient SSS/tachybrady syndrome requiring PPM in 2009.  Amber unclear reasons, received a single lead ppm ([employer]).  Referred to me on 1/2018 for initial evaluation.  Initially underwent PPM interrogation and found to be in sinus w/ V-pacing ~ 43% of the time.  TTE performed w/ mild-mod LV dysfunction (EF ~ 40-45%).  Holter monitor w/ no evidence of arrhythmias.  Seen by Dr. Ramos (EP attending), placed on BB therapy and VVI back up rate decreased to 50 bpm; last interrogation 11/2018 showed V pacing ~ 35%.  \par \par As per sister, patient noted to be more fatigue/lightheaded at times.  Also more depressed/sad.  At times, according to sister, feels "discomfort around the pacemaker area; palpitations?".  Otherwise no other sx's.

## 2019-05-29 DIAGNOSIS — F79 UNSPECIFIED INTELLECTUAL DISABILITIES: ICD-10-CM

## 2019-05-29 DIAGNOSIS — I49.5 SICK SINUS SYNDROME: ICD-10-CM

## 2019-05-29 DIAGNOSIS — I51.9 HEART DISEASE, UNSPECIFIED: ICD-10-CM

## 2019-06-24 ENCOUNTER — APPOINTMENT (OUTPATIENT)
Dept: ELECTROPHYSIOLOGY | Facility: CLINIC | Age: 44
End: 2019-06-24

## 2019-07-17 ENCOUNTER — APPOINTMENT (OUTPATIENT)
Dept: ELECTROPHYSIOLOGY | Facility: CLINIC | Age: 44
End: 2019-07-17
Payer: COMMERCIAL

## 2019-07-17 VITALS
HEIGHT: 66 IN | SYSTOLIC BLOOD PRESSURE: 111 MMHG | OXYGEN SATURATION: 98 % | HEART RATE: 62 BPM | DIASTOLIC BLOOD PRESSURE: 75 MMHG

## 2019-07-17 PROCEDURE — 93280 PM DEVICE PROGR EVAL DUAL: CPT

## 2019-08-07 ENCOUNTER — APPOINTMENT (OUTPATIENT)
Dept: ELECTROPHYSIOLOGY | Facility: CLINIC | Age: 44
End: 2019-08-07
Payer: COMMERCIAL

## 2019-08-07 VITALS
HEIGHT: 66 IN | DIASTOLIC BLOOD PRESSURE: 78 MMHG | BODY MASS INDEX: 27.03 KG/M2 | OXYGEN SATURATION: 95 % | HEART RATE: 53 BPM | WEIGHT: 168.21 LBS | SYSTOLIC BLOOD PRESSURE: 115 MMHG

## 2019-08-07 PROCEDURE — 99214 OFFICE O/P EST MOD 30 MIN: CPT

## 2019-08-07 PROCEDURE — 93000 ELECTROCARDIOGRAM COMPLETE: CPT | Mod: 59

## 2019-08-07 PROCEDURE — 93288 INTERROG EVL PM/LDLS PM IP: CPT

## 2019-08-07 NOTE — PROCEDURE
[No] : not [NSR] : normal sinus rhythm [Pacemaker] : pacemaker [VVI] : VVI [Lead Imp:  ___ohms] : lead impedance was [unfilled] ohms [Threshold Testing Performed] : Threshold testing was performed [___V @] : [unfilled] V [Sensing Amplitude ___mv] : sensing amplitude was [unfilled] mv [___ ms] : [unfilled] ms [de-identified] : [employer] [de-identified] : 60 [de-identified] : 1.5 years [de-identified] : Reporgrammed VVI 50

## 2019-08-07 NOTE — HISTORY OF PRESENT ILLNESS
[de-identified] : Referring Physician: Jacob Singh MD \par \par Dear Jacob:\par \par Mr. Daniel Reyes was seen in the [facility] today. For our records, please allow me to summarize the history and my findings.\par \par This pleasant 44 year old man has a history significant for cerebral palsy/mental retardation and tachy yolande syndrome with placement of a single chamber VVI pacemaker in [facility] with RV pacing in VVI 60 mode of 40-50%. Despite reprogramming he continues to have a high RV pacing burden with exertional SOB and recent TTE showing mild LV dysfucntion with chamber dilatation.\par

## 2019-08-07 NOTE — PHYSICAL EXAM
[Heart Rate And Rhythm] : heart rate and rhythm were normal [Heart Sounds] : normal S1 and S2 [Murmurs] : no murmurs present [Exaggerated Use Of Accessory Muscles For Inspiration] : no accessory muscle use [Respiration, Rhythm And Depth] : normal respiratory rhythm and effort [Auscultation Breath Sounds / Voice Sounds] : lungs were clear to auscultation bilaterally [Clean] : clean [Dry] : dry [Abdomen Soft] : soft [Well-Healed] : well-healed [Abdomen Tenderness] : non-tender [Nail Clubbing] : no clubbing of the fingernails [Abdomen Mass (___ Cm)] : no abdominal mass palpated [Petechial Hemorrhages (___cm)] : no petechial hemorrhages [Cyanosis, Localized] : no localized cyanosis [] : no ischemic changes

## 2019-08-14 ENCOUNTER — OUTPATIENT (OUTPATIENT)
Dept: OUTPATIENT SERVICES | Facility: HOSPITAL | Age: 44
LOS: 1 days | End: 2019-08-14
Payer: COMMERCIAL

## 2019-08-14 VITALS
DIASTOLIC BLOOD PRESSURE: 92 MMHG | TEMPERATURE: 98 F | WEIGHT: 154.98 LBS | OXYGEN SATURATION: 99 % | HEIGHT: 67 IN | SYSTOLIC BLOOD PRESSURE: 116 MMHG | HEART RATE: 55 BPM | RESPIRATION RATE: 16 BRPM

## 2019-08-14 DIAGNOSIS — Z95.0 PRESENCE OF CARDIAC PACEMAKER: Chronic | ICD-10-CM

## 2019-08-14 DIAGNOSIS — Z01.818 ENCOUNTER FOR OTHER PREPROCEDURAL EXAMINATION: ICD-10-CM

## 2019-08-14 DIAGNOSIS — I49.9 CARDIAC ARRHYTHMIA, UNSPECIFIED: ICD-10-CM

## 2019-08-14 LAB
ALBUMIN SERPL ELPH-MCNC: 4.5 G/DL — SIGNIFICANT CHANGE UP (ref 3.3–5)
ALP SERPL-CCNC: 65 U/L — SIGNIFICANT CHANGE UP (ref 40–120)
ALT FLD-CCNC: 18 U/L — SIGNIFICANT CHANGE UP (ref 10–45)
ANION GAP SERPL CALC-SCNC: 10 MMOL/L — SIGNIFICANT CHANGE UP (ref 5–17)
AST SERPL-CCNC: 23 U/L — SIGNIFICANT CHANGE UP (ref 10–40)
BILIRUB SERPL-MCNC: 0.4 MG/DL — SIGNIFICANT CHANGE UP (ref 0.2–1.2)
BLD GP AB SCN SERPL QL: NEGATIVE — SIGNIFICANT CHANGE UP
BUN SERPL-MCNC: 15 MG/DL — SIGNIFICANT CHANGE UP (ref 7–23)
CALCIUM SERPL-MCNC: 9.2 MG/DL — SIGNIFICANT CHANGE UP (ref 8.4–10.5)
CHLORIDE SERPL-SCNC: 103 MMOL/L — SIGNIFICANT CHANGE UP (ref 96–108)
CO2 SERPL-SCNC: 26 MMOL/L — SIGNIFICANT CHANGE UP (ref 22–31)
CREAT SERPL-MCNC: 1.05 MG/DL — SIGNIFICANT CHANGE UP (ref 0.5–1.3)
GLUCOSE SERPL-MCNC: 115 MG/DL — HIGH (ref 70–99)
HCT VFR BLD CALC: 45.5 % — SIGNIFICANT CHANGE UP (ref 39–50)
HGB BLD-MCNC: 14.9 G/DL — SIGNIFICANT CHANGE UP (ref 13–17)
INR BLD: 1.1 RATIO — SIGNIFICANT CHANGE UP (ref 0.88–1.16)
MCHC RBC-ENTMCNC: 28.2 PG — SIGNIFICANT CHANGE UP (ref 27–34)
MCHC RBC-ENTMCNC: 32.8 GM/DL — SIGNIFICANT CHANGE UP (ref 32–36)
MCV RBC AUTO: 86 FL — SIGNIFICANT CHANGE UP (ref 80–100)
PLATELET # BLD AUTO: 214 K/UL — SIGNIFICANT CHANGE UP (ref 150–400)
POTASSIUM SERPL-MCNC: 4.1 MMOL/L — SIGNIFICANT CHANGE UP (ref 3.5–5.3)
POTASSIUM SERPL-SCNC: 4.1 MMOL/L — SIGNIFICANT CHANGE UP (ref 3.5–5.3)
PROT SERPL-MCNC: 7.4 G/DL — SIGNIFICANT CHANGE UP (ref 6–8.3)
PROTHROM AB SERPL-ACNC: 12.6 SEC — SIGNIFICANT CHANGE UP (ref 10–12.9)
RBC # BLD: 5.29 M/UL — SIGNIFICANT CHANGE UP (ref 4.2–5.8)
RBC # FLD: 12.5 % — SIGNIFICANT CHANGE UP (ref 10.3–14.5)
RH IG SCN BLD-IMP: POSITIVE — SIGNIFICANT CHANGE UP
SODIUM SERPL-SCNC: 139 MMOL/L — SIGNIFICANT CHANGE UP (ref 135–145)
WBC # BLD: 7.2 K/UL — SIGNIFICANT CHANGE UP (ref 3.8–10.5)
WBC # FLD AUTO: 7.2 K/UL — SIGNIFICANT CHANGE UP (ref 3.8–10.5)

## 2019-08-14 PROCEDURE — G0463: CPT

## 2019-08-14 PROCEDURE — 86901 BLOOD TYPING SEROLOGIC RH(D): CPT

## 2019-08-14 PROCEDURE — 85027 COMPLETE CBC AUTOMATED: CPT

## 2019-08-14 PROCEDURE — 93005 ELECTROCARDIOGRAM TRACING: CPT

## 2019-08-14 PROCEDURE — 86900 BLOOD TYPING SEROLOGIC ABO: CPT

## 2019-08-14 PROCEDURE — 86850 RBC ANTIBODY SCREEN: CPT

## 2019-08-14 PROCEDURE — 80053 COMPREHEN METABOLIC PANEL: CPT

## 2019-08-14 PROCEDURE — 93010 ELECTROCARDIOGRAM REPORT: CPT

## 2019-08-14 PROCEDURE — 85610 PROTHROMBIN TIME: CPT

## 2019-08-14 NOTE — H&P CARDIOLOGY - HISTORY OF PRESENT ILLNESS
45 y/o male with history significant for cerebral palsy/developmentally delayed, and tachy yolande syndrome s/p Implant of single chamber VVI PPM in [facility] with RV pacing in VVI 60 mode of 40-50%.  He is followed by Dr. Puckett, EP despite reprogramming continues to have high RV pacing burden with exertional SOB and mild LV dysfunction with chamber dilatation noted on recent TTE 5/22/19.  He presents today with his sister Enma Reyes (not his guardian)  for PST for upgrade of his single chamber PPM to dual chamber PPM.  Denies any other implantable devices.  History obtained from [facility] and [facility].      Livia - Kamliah Morris [phone]

## 2019-08-15 ENCOUNTER — TRANSCRIPTION ENCOUNTER (OUTPATIENT)
Age: 44
End: 2019-08-15

## 2019-08-15 ENCOUNTER — INPATIENT (INPATIENT)
Facility: HOSPITAL | Age: 44
LOS: 0 days | Discharge: HOME | DRG: 243 | End: 2019-08-16
Attending: INTERNAL MEDICINE | Admitting: INTERNAL MEDICINE
Payer: COMMERCIAL

## 2019-08-15 VITALS — WEIGHT: 154.98 LBS | HEIGHT: 67 IN

## 2019-08-15 DIAGNOSIS — Z95.0 PRESENCE OF CARDIAC PACEMAKER: Chronic | ICD-10-CM

## 2019-08-15 DIAGNOSIS — I49.9 CARDIAC ARRHYTHMIA, UNSPECIFIED: ICD-10-CM

## 2019-08-15 LAB
BLD GP AB SCN SERPL QL: NEGATIVE — SIGNIFICANT CHANGE UP
RH IG SCN BLD-IMP: POSITIVE — SIGNIFICANT CHANGE UP

## 2019-08-15 PROCEDURE — 33228 REMV&REPLC PM GEN DUAL LEAD: CPT

## 2019-08-15 RX ORDER — CEFAZOLIN SODIUM 1 G
1000 VIAL (EA) INJECTION EVERY 8 HOURS
Refills: 0 | Status: COMPLETED | OUTPATIENT
Start: 2019-08-15 | End: 2019-08-16

## 2019-08-15 RX ADMIN — Medication 100 MILLIGRAM(S): at 17:08

## 2019-08-15 NOTE — PRE-ANESTHESIA EVALUATION ADULT - NSANTHPMHFT_GEN_ALL_CORE
43 y/o M w/ cerebral palsy & developmental delay, tachy-yolande syndrome presents for PPM upgrade from single chamber VVI to dual chamber. Has had increased RV pacing burden a/w SOB & mild LV dysfunction w/ dilation on TTE 5/22/19. LVEF 45%.

## 2019-08-15 NOTE — DISCHARGE NOTE PROVIDER - NSDCFUSCHEDAPPT_GEN_ALL_CORE_FT
REYES, DANIEL ; 08/28/2019 ; [facility] Cardio 300 Comm OP REYES, DANIEL ; 08/30/2019 ; [facility] Cardio Electro 300 Comm Dr REYES, DANIEL ; 09/18/2019 ; N[facility] Cardio Electro 300 Comm

## 2019-08-15 NOTE — DISCHARGE NOTE PROVIDER - HOSPITAL COURSE
43 y/o male with history significant for cerebral palsy/developmentally delayed, and tachy yolande syndrome s/p Implant of single chamber VVI PPM in [facility] with RV pacing in VVI 60 mode of 40-50%.  He is followed by Dr. Puckett, EP despite reprogramming continues to have high RV pacing burden with exertional SOB and mild LV dysfunction with chamber dilatation noted on recent TTE 5/22/19.  He presents today with his sister Enma Reyes (not his guardian)  for PST for upgrade of his single chamber PPM to dual chamber PPM.  Denies any other implantable devices.  [facility]. Pt is now s/p upgrade PPM upgrade to dual chamber via LACW. Pt tolerated the procedure well, procedure site benign. Post-procedure dischargfe instructions discussed and questions addressed 45 y/o male with history significant for cerebral palsy/developmentally delayed, and tachy yolande syndrome s/p Implant of single chamber VVI PPM in [facility] with RV pacing in VVI 60 mode of 40-50%.  He is followed by Dr. Puckett, EP despite reprogramming continues to have high RV pacing burden with exertional SOB and mild LV dysfunction with chamber dilatation noted on recent TTE 5/22/19.  He presents today with his sister Enma Reyes (not his guardian)  for PST for upgrade of his single chamber PPM to dual chamber PPM.  Denies any other implantable devices.  History obtained from [facility] and [employer]. Patient is now s/p PPM upgrade to dual chamber - Medtronic device to left ACW.  He tolerated the procedure well.  Post procedural and AM EKG A-Paced with appropriate capture.  Post procedural CXR without evidence of pneumothorax.  Provider teaching with patients family done by writer.  Device paired by Medtronic rep.  Left ACW incision well approximated, site without presence of bleeding/hematoma, patient denies pain.  Patient remains hemodynamically stable and his hospital course was otherwise uneventful.  Patient is now medically stable for discharge home today as per attending.

## 2019-08-15 NOTE — DISCHARGE NOTE PROVIDER - NSDCCPTREATMENT_GEN_ALL_CORE_FT
PRINCIPAL PROCEDURE  Procedure: Insertion of dual lead pacemaker  Findings and Treatment: S/p Medtronic PPM dual chamber

## 2019-08-15 NOTE — PRE-ANESTHESIA EVALUATION ADULT - NSANTHAIRWAYFT_ENT_ALL_CORE
Mouth opening: >2cm  Thyromental distance: >3 FBs  Upper lip bite: large overbite  Cervical ROM: grossly intact

## 2019-08-15 NOTE — DISCHARGE NOTE PROVIDER - NSDCCPCAREPLAN_GEN_ALL_CORE_FT
PRINCIPAL DISCHARGE DIAGNOSIS  Diagnosis: Cardiac pacemaker  Assessment and Plan of Treatment: Your heart rate will be controlled.   Continue with follow-up visits to your electrophysiology team and with your home remote device monitoring (if applicable). Continue your medications as prescribed.

## 2019-08-16 ENCOUNTER — TRANSCRIPTION ENCOUNTER (OUTPATIENT)
Age: 44
End: 2019-08-16

## 2019-08-16 VITALS
TEMPERATURE: 98 F | SYSTOLIC BLOOD PRESSURE: 125 MMHG | OXYGEN SATURATION: 100 % | RESPIRATION RATE: 17 BRPM | DIASTOLIC BLOOD PRESSURE: 86 MMHG | HEART RATE: 90 BPM

## 2019-08-16 LAB
ANION GAP SERPL CALC-SCNC: 13 MMOL/L — SIGNIFICANT CHANGE UP (ref 5–17)
BUN SERPL-MCNC: 16 MG/DL — SIGNIFICANT CHANGE UP (ref 7–23)
CALCIUM SERPL-MCNC: 9.1 MG/DL — SIGNIFICANT CHANGE UP (ref 8.4–10.5)
CHLORIDE SERPL-SCNC: 104 MMOL/L — SIGNIFICANT CHANGE UP (ref 96–108)
CO2 SERPL-SCNC: 23 MMOL/L — SIGNIFICANT CHANGE UP (ref 22–31)
CREAT SERPL-MCNC: 1.01 MG/DL — SIGNIFICANT CHANGE UP (ref 0.5–1.3)
GLUCOSE SERPL-MCNC: 115 MG/DL — HIGH (ref 70–99)
HCT VFR BLD CALC: 43.9 % — SIGNIFICANT CHANGE UP (ref 39–50)
HGB BLD-MCNC: 14.4 G/DL — SIGNIFICANT CHANGE UP (ref 13–17)
MCHC RBC-ENTMCNC: 28 PG — SIGNIFICANT CHANGE UP (ref 27–34)
MCHC RBC-ENTMCNC: 32.7 GM/DL — SIGNIFICANT CHANGE UP (ref 32–36)
MCV RBC AUTO: 85.6 FL — SIGNIFICANT CHANGE UP (ref 80–100)
PLATELET # BLD AUTO: 192 K/UL — SIGNIFICANT CHANGE UP (ref 150–400)
POTASSIUM SERPL-MCNC: 3.9 MMOL/L — SIGNIFICANT CHANGE UP (ref 3.5–5.3)
POTASSIUM SERPL-SCNC: 3.9 MMOL/L — SIGNIFICANT CHANGE UP (ref 3.5–5.3)
RBC # BLD: 5.13 M/UL — SIGNIFICANT CHANGE UP (ref 4.2–5.8)
RBC # FLD: 12.4 % — SIGNIFICANT CHANGE UP (ref 10.3–14.5)
SODIUM SERPL-SCNC: 140 MMOL/L — SIGNIFICANT CHANGE UP (ref 135–145)
WBC # BLD: 8.5 K/UL — SIGNIFICANT CHANGE UP (ref 3.8–10.5)
WBC # FLD AUTO: 8.5 K/UL — SIGNIFICANT CHANGE UP (ref 3.8–10.5)

## 2019-08-16 PROCEDURE — 71046 X-RAY EXAM CHEST 2 VIEWS: CPT | Mod: 26

## 2019-08-16 PROCEDURE — 93010 ELECTROCARDIOGRAM REPORT: CPT

## 2019-08-16 RX ADMIN — Medication 100 MILLIGRAM(S): at 00:29

## 2019-08-16 NOTE — PROGRESS NOTE ADULT - ASSESSMENT
Patient is a 44y old  Male who presents with a chief complaint of PPM Upgrade (15 Aug 2019 18:42) now s/p device implant via LACW.  Pt tolerated the procedure well, site benign. Overnight remained uneventful. Discharge pending today

## 2019-08-16 NOTE — PROGRESS NOTE ADULT - SUBJECTIVE AND OBJECTIVE BOX
Patient is a 44y old  Male who presents with a chief complaint of PPM Upgrade (15 Aug 2019 18:42) now s/p device implant via LACW           Allergies    No Known Allergies    Intolerances        Medications:      Vitals:  T(C): 36.6 (08-15-19 @ 21:09), Max: 36.6 (08-15-19 @ 21:09)  HR: 84 (08-15-19 @ 21:09) (60 - 84)  BP: 115/73 (08-15-19 @ 21:09) (114/75 - 143/87)  BP(mean): --  RR: 16 (08-15-19 @ 21:09) (16 - 16)  SpO2: 100% (08-15-19 @ 21:09) (98% - 100%)  Wt(kg): --  Daily Height in cm: 170.18 (15 Aug 2019 07:36)    Daily   I&O's Summary    15 Aug 2019 07:01  -  16 Aug 2019 05:13  --------------------------------------------------------  IN: 240 mL / OUT: 0 mL / NET: 240 mL          Physical Exam:  Appearance: Normal  Procedural Access Site: LACW No hematoma, Non-tender to palpation, 2+ pulse, No bruit, No Ecchymosis  Psychiatry: AAOx3, Mood & affect appropriate  Skin: No rashes, No ecchymoses, No cyanosis    08-16    140  |  104  |  16  ----------------------------<  115<H>  3.9   |  23  |  1.01    Ca    9.1      16 Aug 2019 01:14    TPro  7.4  /  Alb  4.5  /  TBili  0.4  /  DBili  x   /  AST  23  /  ALT  18  /  AlkPhos  65  08-14    PT/INR - ( 14 Aug 2019 07:47 )   PT: 12.6 sec;   INR: 1.10 ratio

## 2019-08-16 NOTE — DISCHARGE NOTE NURSING/CASE MANAGEMENT/SOCIAL WORK - NSDCDPATPORTLINK_GEN_ALL_CORE
You can access the [facility], offered by [facility], by registering with the following website: [email or web address]

## 2019-08-28 ENCOUNTER — APPOINTMENT (OUTPATIENT)
Dept: CARDIOLOGY | Facility: HOSPITAL | Age: 44
End: 2019-08-28

## 2019-08-28 ENCOUNTER — OUTPATIENT (OUTPATIENT)
Dept: OUTPATIENT SERVICES | Facility: HOSPITAL | Age: 44
LOS: 1 days | End: 2019-08-28
Payer: COMMERCIAL

## 2019-08-28 VITALS
DIASTOLIC BLOOD PRESSURE: 81 MMHG | OXYGEN SATURATION: 98 % | HEART RATE: 64 BPM | HEIGHT: 66 IN | SYSTOLIC BLOOD PRESSURE: 111 MMHG

## 2019-08-28 DIAGNOSIS — I25.10 ATHEROSCLEROTIC HEART DISEASE OF NATIVE CORONARY ARTERY WITHOUT ANGINA PECTORIS: ICD-10-CM

## 2019-08-28 DIAGNOSIS — Z95.0 PRESENCE OF CARDIAC PACEMAKER: Chronic | ICD-10-CM

## 2019-08-28 PROBLEM — G80.9 CEREBRAL PALSY, UNSPECIFIED: Chronic | Status: ACTIVE | Noted: 2019-08-14

## 2019-08-28 PROBLEM — I49.5 SICK SINUS SYNDROME: Chronic | Status: ACTIVE | Noted: 2019-08-14

## 2019-08-28 PROCEDURE — G0463: CPT

## 2019-08-28 NOTE — HISTORY OF PRESENT ILLNESS
[FreeTextEntry1] : 41 yo man w/ hx of SSS s/p PPM 2009 (back in 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 History primarily obtained from sister as patient does not speak. \par \par Regarding his cardiac history:  Patient SSS/tachybrady syndrome requiring PPM in 2009.  Amber unclear reasons, received a single lead ppm (biotronik).  Referred to me on 1/2018 for initial evaluation.  Initially underwent PPM interrogation and found to be in sinus w/ V-pacing ~ 30-40% of the time.  TTE performed w/ mod LV dysfunction (EF ~ 40-45%).  Seen by Dr. Ramos (EP attending), placed on BB therapy and VVI back up rate decreased to 50 bpm; However despite this, was still RV pacing ~ 40% of the time.  \par \par Due to this, pt underwent device upgrade to dual chamber medtronic PPM on 8/15/2019 by Dr. Reese.  States that his sx's (dyspnea, palpitations), have resolved.

## 2019-08-28 NOTE — DISCUSSION/SUMMARY
[FreeTextEntry1] : 41 yo man w/ hx of SSS s/p single lead PPM 2009 (biotronik, in [facility]), Developmental delay (since birth) who presents for follow up. TTE w/ mold LV dysfunction (EF 40-45% 5/2019).  Reviewed ECG post upgrade on 8/12 which revealed intermittent a pacing.  LV dysfunction likely pacemaker-mediated.  Off BB therapy at this point.  \par \par Plan\par - will obtain TTE in 4 months to re-evaluate LV function \par - will f/u w/ Dr. Reese this week\par \par RTC in 4 months.  Repeat lipid panel then (last ; ACVD risk <7.5%).

## 2019-08-29 DIAGNOSIS — I42.8 OTHER CARDIOMYOPATHIES: ICD-10-CM

## 2019-08-29 DIAGNOSIS — I49.5 SICK SINUS SYNDROME: ICD-10-CM

## 2019-08-30 ENCOUNTER — APPOINTMENT (OUTPATIENT)
Dept: ELECTROPHYSIOLOGY | Facility: CLINIC | Age: 44
End: 2019-08-30
Payer: COMMERCIAL

## 2019-08-30 ENCOUNTER — NON-APPOINTMENT (OUTPATIENT)
Age: 44
End: 2019-08-30

## 2019-08-30 VITALS
WEIGHT: 158 LBS | DIASTOLIC BLOOD PRESSURE: 75 MMHG | BODY MASS INDEX: 25.39 KG/M2 | HEART RATE: 60 BPM | HEIGHT: 66 IN | SYSTOLIC BLOOD PRESSURE: 115 MMHG | OXYGEN SATURATION: 98 %

## 2019-08-30 PROCEDURE — 99024 POSTOP FOLLOW-UP VISIT: CPT

## 2019-09-18 ENCOUNTER — APPOINTMENT (OUTPATIENT)
Dept: ELECTROPHYSIOLOGY | Facility: CLINIC | Age: 44
End: 2019-09-18

## 2019-10-14 ENCOUNTER — OUTPATIENT (OUTPATIENT)
Dept: OUTPATIENT SERVICES | Facility: HOSPITAL | Age: 44
LOS: 1 days | End: 2019-10-14
Payer: COMMERCIAL

## 2019-10-14 ENCOUNTER — APPOINTMENT (OUTPATIENT)
Dept: INTERNAL MEDICINE | Facility: CLINIC | Age: 44
End: 2019-10-14
Payer: COMMERCIAL

## 2019-10-14 VITALS
HEART RATE: 62 BPM | HEIGHT: 66 IN | WEIGHT: 168 LBS | OXYGEN SATURATION: 98 % | DIASTOLIC BLOOD PRESSURE: 80 MMHG | SYSTOLIC BLOOD PRESSURE: 120 MMHG | BODY MASS INDEX: 27 KG/M2

## 2019-10-14 DIAGNOSIS — Z09 ENCOUNTER FOR FOLLOW-UP EXAMINATION AFTER COMPLETED TREATMENT FOR CONDITIONS OTHER THAN MALIGNANT NEOPLASM: ICD-10-CM

## 2019-10-14 DIAGNOSIS — Z23 ENCOUNTER FOR IMMUNIZATION: ICD-10-CM

## 2019-10-14 DIAGNOSIS — Z95.0 PRESENCE OF CARDIAC PACEMAKER: ICD-10-CM

## 2019-10-14 DIAGNOSIS — Z95.0 PRESENCE OF CARDIAC PACEMAKER: Chronic | ICD-10-CM

## 2019-10-14 DIAGNOSIS — I10 ESSENTIAL (PRIMARY) HYPERTENSION: ICD-10-CM

## 2019-10-14 DIAGNOSIS — I49.5 SICK SINUS SYNDROME: ICD-10-CM

## 2019-10-14 PROCEDURE — 90688 IIV4 VACCINE SPLT 0.5 ML IM: CPT

## 2019-10-14 PROCEDURE — G0463: CPT | Mod: 25

## 2019-10-14 PROCEDURE — G0008: CPT

## 2019-10-14 PROCEDURE — 99213 OFFICE O/P EST LOW 20 MIN: CPT | Mod: GE

## 2019-10-17 PROBLEM — Z09 HOSPITAL DISCHARGE FOLLOW-UP: Status: ACTIVE | Noted: 2019-10-17

## 2019-10-17 NOTE — HISTORY OF PRESENT ILLNESS
[FreeTextEntry1] : follow up from hospitalization  [de-identified] : 45 yo M with autism and TBS s/p pacemaker (Valerie) presents as follow up after discharge from the hospital on Aug 15th. Patient switched from a 1 lead pacemaker to a 2 lead pacemaker after experiencing SOB and chest pain. Patient followed up with cardiology Aug 28th and will get an Echo (last echo 45% EF) in Dec. Labs in the hospital were unremarkable. Today, patient looks well, accompanied with his sister. He denies chest pain, palpiations and SOB. Incision site healing well. Patient will receive Flu vacine today. \par \par \par

## 2019-10-17 NOTE — PHYSICAL EXAM
[No Acute Distress] : no acute distress [Well Nourished] : well nourished [Well Developed] : well developed [Normal Sclera/Conjunctiva] : normal sclera/conjunctiva [PERRL] : pupils equal round and reactive to light [EOMI] : extraocular movements intact [No Respiratory Distress] : no respiratory distress  [Clear to Auscultation] : lungs were clear to auscultation bilaterally [Normal Rate] : normal rate  [Regular Rhythm] : with a regular rhythm [Normal S1, S2] : normal S1 and S2 [No Edema] : there was no peripheral edema [Soft] : abdomen soft [Non-distended] : non-distended [Non Tender] : non-tender [Normal Bowel Sounds] : normal bowel sounds [Coordination Grossly Intact] : coordination grossly intact [No Focal Deficits] : no focal deficits [de-identified] : Incision healing well, no erythema or drainage

## 2019-10-17 NOTE — PLAN
[FreeTextEntry1] : 43 yo M with autism and TBS s/p pacemaker (Valerie) presents as follow up after discharge from the hospital Aug 15 for pacemaker exchange to 2 leads. \par \par #SSS s/p dual chamber pacemaker\par -f/u with cardiology for TTE and lipid panel in 3 months\par -last echo 05/2019 LV dysfunction with EF 45%\par -no longer taking beta blocker\par \par #Health Maintenance\par -flu shot today \par \par RTC in January for CPE

## 2019-10-31 PROCEDURE — 86850 RBC ANTIBODY SCREEN: CPT

## 2019-10-31 PROCEDURE — 93005 ELECTROCARDIOGRAM TRACING: CPT

## 2019-10-31 PROCEDURE — 71046 X-RAY EXAM CHEST 2 VIEWS: CPT

## 2019-10-31 PROCEDURE — C1785: CPT

## 2019-10-31 PROCEDURE — 80048 BASIC METABOLIC PNL TOTAL CA: CPT

## 2019-10-31 PROCEDURE — C1892: CPT

## 2019-10-31 PROCEDURE — 85027 COMPLETE CBC AUTOMATED: CPT

## 2019-10-31 PROCEDURE — C1898: CPT

## 2019-10-31 PROCEDURE — 86901 BLOOD TYPING SEROLOGIC RH(D): CPT

## 2019-10-31 PROCEDURE — C1894: CPT

## 2019-10-31 PROCEDURE — 33228 REMV&REPLC PM GEN DUAL LEAD: CPT

## 2019-10-31 PROCEDURE — C1769: CPT

## 2019-10-31 PROCEDURE — 86900 BLOOD TYPING SEROLOGIC ABO: CPT

## 2019-11-27 LAB
AMINO ACIDS FLD-SCNC: NORMAL
CHOLEST SERPL-MCNC: 192 MG/DL
CHOLEST/HDLC SERPL: 5.5 RATIO
HDLC SERPL-MCNC: 35 MG/DL
HIGH RESOLUTION CHROMOSOMAL MICROARRAY: NORMAL
LDLC SERPL CALC-MCNC: 110 MG/DL
ORGANIC ACIDS UR-MCNC: NORMAL
TRIGL SERPL-MCNC: 236 MG/DL

## 2020-01-15 ENCOUNTER — OUTPATIENT (OUTPATIENT)
Dept: OUTPATIENT SERVICES | Facility: HOSPITAL | Age: 45
LOS: 1 days | End: 2020-01-15
Payer: COMMERCIAL

## 2020-01-15 ENCOUNTER — APPOINTMENT (OUTPATIENT)
Dept: ELECTROPHYSIOLOGY | Facility: CLINIC | Age: 45
End: 2020-01-15
Payer: COMMERCIAL

## 2020-01-15 ENCOUNTER — APPOINTMENT (OUTPATIENT)
Dept: CARDIOLOGY | Facility: HOSPITAL | Age: 45
End: 2020-01-15

## 2020-01-15 VITALS
HEART RATE: 57 BPM | OXYGEN SATURATION: 96 % | DIASTOLIC BLOOD PRESSURE: 80 MMHG | HEIGHT: 66 IN | SYSTOLIC BLOOD PRESSURE: 118 MMHG

## 2020-01-15 DIAGNOSIS — Z95.0 PRESENCE OF CARDIAC PACEMAKER: Chronic | ICD-10-CM

## 2020-01-15 DIAGNOSIS — I25.10 ATHEROSCLEROTIC HEART DISEASE OF NATIVE CORONARY ARTERY WITHOUT ANGINA PECTORIS: ICD-10-CM

## 2020-01-15 PROCEDURE — G0463: CPT

## 2020-01-15 PROCEDURE — 93280 PM DEVICE PROGR EVAL DUAL: CPT

## 2020-01-15 NOTE — PHYSICAL EXAM
[General Appearance - Well Developed] : well developed [General Appearance - In No Acute Distress] : no acute distress [Normal Conjunctiva] : the conjunctiva exhibited no abnormalities [No Oral Pallor] : no oral pallor [Normal Oral Mucosa] : normal oral mucosa [Normal Jugular Venous A Waves Present] : normal jugular venous A waves present [Normal Jugular Venous V Waves Present] : normal jugular venous V waves present [No Jugular Venous Arias A Waves] : no jugular venous arias A waves [Heart Sounds] : normal S1 and S2 [Auscultation Breath Sounds / Voice Sounds] : lungs were clear to auscultation bilaterally [Heart Rate And Rhythm] : heart rate and rhythm were normal [Murmurs] : no murmurs present [Abdomen Tenderness] : non-tender [Bowel Sounds] : normal bowel sounds [Abnormal Walk] : normal gait [] : no rash

## 2020-01-22 LAB
CHOLEST SERPL-MCNC: 163 MG/DL
CHOLEST/HDLC SERPL: 5.7 RATIO
HDLC SERPL-MCNC: 29 MG/DL
LDLC SERPL CALC-MCNC: 90 MG/DL
TRIGL SERPL-MCNC: 222 MG/DL

## 2020-01-22 NOTE — HISTORY OF PRESENT ILLNESS
[FreeTextEntry1] : 43 yo man w/ hx of SSS s/p PPM 2009 (back in 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 History primarily obtained from sister as patient does not speak. \par \par Doing well w/ resolution of dyspnea and palpitations since device upgrade to Dual Chamber PPM by Dr. Reese.  No other new/acute complaints.  \par \par Regarding his cardiac history:  Patient SSS/tachybrady syndrome requiring PPM in 2009.  Amber unclear reasons, received a single lead ppm ([id number]).  Referred to me on 1/2018 for initial evaluation.  Initially underwent PPM interrogation and found to be in sinus w/ V-pacing ~ 30-40% of the time.  TTE performed w/ mod LV dysfunction (EF ~ 40-45%).  Seen by Dr. Ramos (EP attending) and trialed on BB therapy and VVI back up rate decreased to 50 bpm; However despite this, was still RV pacing ~ 40% of the time.   Due to this, pt underwent device upgrade to dual chamber medtronic PPM on 8/15/2019 by Dr. Reese.

## 2020-01-22 NOTE — DISCUSSION/SUMMARY
[FreeTextEntry1] : 43 yo man w/ hx of SSS s/p single lead PPM 2009 (biotronik, in [facility]), autism/mental retardation (since birth) who presents for follow up.  Upgraded to medtronic Dual Chamber PPM w/ symptomatic improvement. \par \par Plan\par - obtain TTE to re-evaluate LV function \par - repeat lipid panel \par \par RTC in 4 months.

## 2020-01-24 DIAGNOSIS — R00.2 PALPITATIONS: ICD-10-CM

## 2020-01-24 DIAGNOSIS — I49.5 SICK SINUS SYNDROME: ICD-10-CM

## 2020-03-02 ENCOUNTER — OUTPATIENT (OUTPATIENT)
Dept: OUTPATIENT SERVICES | Facility: HOSPITAL | Age: 45
LOS: 1 days | End: 2020-03-02
Payer: COMMERCIAL

## 2020-03-02 ENCOUNTER — APPOINTMENT (OUTPATIENT)
Dept: INTERNAL MEDICINE | Facility: CLINIC | Age: 45
End: 2020-03-02
Payer: COMMERCIAL

## 2020-03-02 VITALS
HEIGHT: 66 IN | HEART RATE: 84 BPM | WEIGHT: 171 LBS | BODY MASS INDEX: 27.48 KG/M2 | OXYGEN SATURATION: 99 % | SYSTOLIC BLOOD PRESSURE: 120 MMHG | DIASTOLIC BLOOD PRESSURE: 80 MMHG

## 2020-03-02 DIAGNOSIS — Z95.0 PRESENCE OF CARDIAC PACEMAKER: Chronic | ICD-10-CM

## 2020-03-02 DIAGNOSIS — I10 ESSENTIAL (PRIMARY) HYPERTENSION: ICD-10-CM

## 2020-03-02 PROCEDURE — G0463: CPT

## 2020-03-02 PROCEDURE — 99213 OFFICE O/P EST LOW 20 MIN: CPT | Mod: GE

## 2020-03-02 RX ORDER — KETOTIFEN FUMARATE 0.25 MG/ML
0.03 SOLUTION/ DROPS OPHTHALMIC
Qty: 5 | Refills: 3 | Status: COMPLETED | COMMUNITY
Start: 2018-04-24 | End: 2020-03-02

## 2020-03-02 RX ORDER — OLOPATADINE HYDROCHLORIDE 2 MG/ML
0.2 SOLUTION OPHTHALMIC DAILY
Qty: 1 | Refills: 0 | Status: COMPLETED | COMMUNITY
Start: 2018-04-24 | End: 2020-03-02

## 2020-03-03 LAB — TSH SERPL-ACNC: 1.54 UIU/ML

## 2020-03-11 DIAGNOSIS — G47.00 INSOMNIA, UNSPECIFIED: ICD-10-CM

## 2020-03-11 DIAGNOSIS — H61.23 IMPACTED CERUMEN, BILATERAL: ICD-10-CM

## 2020-03-11 NOTE — PHYSICAL EXAM
[No Acute Distress] : no acute distress [Well Nourished] : well nourished [Well Developed] : well developed [Normal Sclera/Conjunctiva] : normal sclera/conjunctiva [EOMI] : extraocular movements intact [Normal Outer Ear/Nose] : the outer ears and nose were normal in appearance [Clear to Auscultation] : lungs were clear to auscultation bilaterally [Normal Rate] : normal rate  [Regular Rhythm] : with a regular rhythm [No Murmur] : no murmur heard [No Edema] : there was no peripheral edema [No Extremity Clubbing/Cyanosis] : no extremity clubbing/cyanosis [Soft] : abdomen soft [Non Tender] : non-tender [Normal Bowel Sounds] : normal bowel sounds [No Rash] : no rash [No Focal Deficits] : no focal deficits [de-identified] : Bilateral cerumen impaction blocking visualization of ear canal.

## 2020-03-11 NOTE — ASSESSMENT
[FreeTextEntry1] : 43 yo M with autism/development delay (unable to communicate) and TBS s/p pacemaker ([facility]) who presents for  trouble hearing. \par \par #Bilateral cerumen impaction \par - Debrox ordered. \par - ENT referral provided. \par \par #Insomnia \par - Checking TSH to assess. \par - Continue with melatonin for now. \par - Neurology referral  for management of insomnia given comorbidities. \par \par #HCM\par -TDAP two years ago. \par \par Discussed with Dr. Varner \par \par Parish Navas PGY-3 \par \par

## 2020-03-11 NOTE — REVIEW OF SYSTEMS
[Recent Change In Weight] : ~T recent weight change [Hearing Loss] : hearing loss [Insomnia] : insomnia [Fever] : no fever [Fatigue] : no fatigue [Sore Throat] : no sore throat [Depression] : no depression

## 2020-03-11 NOTE — HISTORY OF PRESENT ILLNESS
[FreeTextEntry1] : Hearing loss [de-identified] : 45 yo M with autism/development delay (unable to communicate) and TBS s/p pacemaker ([facility]) who presents for  trouble hearing,  Accompanied by sister who is her care taker. For the past 3 weeks he has difficulty hearing. Denied fever, chills. He has also trouble sleeping. He has been taking melatonin without improvement of sleep habits. Sister says he only sleep for an hour or two and not able to sleep. Last night he went to be at 11pm and then woke up at 1 am. Sister denies recent depression, loss of interest. He is more restless. He received tetanus vaccine nov 2018. He has seen dentist and ophthalmologist. Sister endorses weight gain.

## 2020-04-08 ENCOUNTER — APPOINTMENT (OUTPATIENT)
Dept: ELECTROPHYSIOLOGY | Facility: CLINIC | Age: 45
End: 2020-04-08
Payer: COMMERCIAL

## 2020-04-08 PROCEDURE — 93296 REM INTERROG EVL PM/IDS: CPT

## 2020-04-08 PROCEDURE — 93294 REM INTERROG EVL PM/LDLS PM: CPT

## 2020-05-20 ENCOUNTER — APPOINTMENT (OUTPATIENT)
Dept: CARDIOLOGY | Facility: HOSPITAL | Age: 45
End: 2020-05-20

## 2020-06-10 ENCOUNTER — APPOINTMENT (OUTPATIENT)
Dept: OTOLARYNGOLOGY | Facility: CLINIC | Age: 45
End: 2020-06-10
Payer: COMMERCIAL

## 2020-06-10 VITALS
HEART RATE: 60 BPM | WEIGHT: 171 LBS | DIASTOLIC BLOOD PRESSURE: 81 MMHG | TEMPERATURE: 98.5 F | BODY MASS INDEX: 27.48 KG/M2 | HEIGHT: 66 IN | SYSTOLIC BLOOD PRESSURE: 119 MMHG

## 2020-06-10 DIAGNOSIS — H93.293 OTHER ABNORMAL AUDITORY PERCEPTIONS, BILATERAL: ICD-10-CM

## 2020-06-10 PROCEDURE — 69200 CLEAR OUTER EAR CANAL: CPT | Mod: RT

## 2020-06-10 PROCEDURE — 99204 OFFICE O/P NEW MOD 45 MIN: CPT | Mod: 25

## 2020-06-10 NOTE — PROCEDURE
[FreeTextEntry3] : Procedure - Cerumen Removal. \par After informed verbal consent is obtained, cerumen is removed from the b/l ear canal and foreign body from left ear canal with a curette, alligator forceps, right angle, peroxide and suction.  Normal appearing canal following removal.\par \par

## 2020-06-10 NOTE — ASSESSMENT
[FreeTextEntry1] : B/l severe cerumen impaction with Right ear foreign body\par - Cerumen and foreign body removed in office.  \par - F/U 3 months for cerumen check. \par - If still concern for hearing at that time will plan for Audiogram.

## 2020-06-10 NOTE — PHYSICAL EXAM
[Midline] : trachea located in midline position [Normal] : no rashes [FreeTextEntry1] : Nonverbal  [de-identified] : b/l cerumen impaction with right ear foreign body [de-identified] : micrognathia

## 2020-06-10 NOTE — HISTORY OF PRESENT ILLNESS
[de-identified] : 46 y/o Pt. with Autism, nonverbal.  Mother notes his hearing seems to be decreasing over the past few months.  They went to PMD who noted cerumen impaction.   No pain, no d/c, no fevers.

## 2020-06-10 NOTE — CONSULT LETTER
[Dear  ___] : Dear  [unfilled], [Please see my note below.] : Please see my note below. [Consult Letter:] : I had the pleasure of evaluating your patient, [unfilled]. [Consult Closing:] : Thank you very much for allowing me to participate in the care of this patient.  If you have any questions, please do not hesitate to contact me. [Sincerely,] : Sincerely, [FreeTextEntry3] : Jumana Irvin MD\par Otolaryngology and Cranial Base Surgery\par Attending Physician - Department of Otolaryngology and Head & Neck Surgery\par [facility]\par  - [employer] [facility]\par Office: [phone]\par Fax: [phone]\par

## 2020-06-10 NOTE — REASON FOR VISIT
[Initial Consultation] : an initial consultation for [FreeTextEntry2] : Ear fullness 0 = independent

## 2020-07-10 ENCOUNTER — APPOINTMENT (OUTPATIENT)
Dept: ELECTROPHYSIOLOGY | Facility: CLINIC | Age: 45
End: 2020-07-10
Payer: COMMERCIAL

## 2020-07-10 PROCEDURE — 93294 REM INTERROG EVL PM/LDLS PM: CPT

## 2020-07-10 PROCEDURE — 93296 REM INTERROG EVL PM/IDS: CPT

## 2020-08-11 ENCOUNTER — APPOINTMENT (OUTPATIENT)
Dept: CARDIOLOGY | Facility: HOSPITAL | Age: 45
End: 2020-08-11

## 2020-08-11 ENCOUNTER — NON-APPOINTMENT (OUTPATIENT)
Age: 45
End: 2020-08-11

## 2020-08-11 ENCOUNTER — OUTPATIENT (OUTPATIENT)
Dept: OUTPATIENT SERVICES | Facility: HOSPITAL | Age: 45
LOS: 1 days | End: 2020-08-11
Payer: COMMERCIAL

## 2020-08-11 VITALS — HEART RATE: 113 BPM | OXYGEN SATURATION: 98 % | SYSTOLIC BLOOD PRESSURE: 114 MMHG | DIASTOLIC BLOOD PRESSURE: 78 MMHG

## 2020-08-11 DIAGNOSIS — Z95.0 PRESENCE OF CARDIAC PACEMAKER: Chronic | ICD-10-CM

## 2020-08-11 DIAGNOSIS — I25.10 ATHEROSCLEROTIC HEART DISEASE OF NATIVE CORONARY ARTERY WITHOUT ANGINA PECTORIS: ICD-10-CM

## 2020-08-11 PROCEDURE — 93005 ELECTROCARDIOGRAM TRACING: CPT

## 2020-08-11 PROCEDURE — G0463: CPT

## 2020-08-17 NOTE — ASSESSMENT
[FreeTextEntry1] : 45 year old man has a history significant for intellectual disability and tachy yolande syndrome with placement of a single chamber VVI pacemaker in [facility] (approx 2009) s/p upgrade to dual chamber Medtronic PPM 2019, here for follow up. Pt doing well overall without complaints\par \par Tachybrady Syndrome s/p PPM \par Upgraded to Medtronic dual chamber PPM 8/2019\par Most recent Pacemaker interrogation (April 2020) Atrial Pacing 51.6%, episode of NSVT and SVT noted\par No symptoms elicited during todays exam\par Recc repeat ECHO to eval previously noted LV dysfunction \par \par RTC in 6 months

## 2020-08-17 NOTE — PHYSICAL EXAM
[General Appearance - Well Developed] : well developed [Normal Conjunctiva] : the conjunctiva exhibited no abnormalities [Normal Jugular Venous A Waves Present] : normal jugular venous A waves present [Respiration, Rhythm And Depth] : normal respiratory rhythm and effort [Heart Rate And Rhythm] : heart rate and rhythm were normal [Heart Sounds] : normal S1 and S2 [Abdomen Soft] : soft [FreeTextEntry1] : At baseline mental status

## 2020-08-17 NOTE — HISTORY OF PRESENT ILLNESS
[FreeTextEntry1] : 45 year old man has a history significant for intellectual disability and tachy yolande syndrome with placement of a single chamber VVI pacemaker in [facility] (approx 2009) s/p upgrade to dual chamber Medtronic PPM 2019, here for follow up. \par \par Pt accompanied by sister who provides history, pt is largely nonverbal. Pt lives with other family members. Pt remains largely at home because of COVID concerns. He walks outside intermittently and plays with nephew often. Sister denies recent history of chest pain, shortness of breath, lightheadedness of dizziness. Additional review of system cannot be obtained as pt largely non-verbal. \par \par Medications: \par None

## 2020-08-21 ENCOUNTER — APPOINTMENT (OUTPATIENT)
Dept: ELECTROPHYSIOLOGY | Facility: CLINIC | Age: 45
End: 2020-08-21

## 2020-08-24 DIAGNOSIS — I49.5 SICK SINUS SYNDROME: ICD-10-CM

## 2020-09-11 ENCOUNTER — APPOINTMENT (OUTPATIENT)
Dept: OTOLARYNGOLOGY | Facility: CLINIC | Age: 45
End: 2020-09-11
Payer: COMMERCIAL

## 2020-09-11 VITALS
SYSTOLIC BLOOD PRESSURE: 112 MMHG | HEIGHT: 66 IN | DIASTOLIC BLOOD PRESSURE: 76 MMHG | HEART RATE: 60 BPM | TEMPERATURE: 98 F | BODY MASS INDEX: 27.48 KG/M2 | WEIGHT: 171 LBS

## 2020-09-11 PROCEDURE — 69210 REMOVE IMPACTED EAR WAX UNI: CPT

## 2020-09-11 PROCEDURE — 99213 OFFICE O/P EST LOW 20 MIN: CPT | Mod: 25

## 2020-09-11 NOTE — PHYSICAL EXAM
[Normal] : palatine tonsils are normal [Midline] : trachea located in midline position [de-identified] : b/l nikin

## 2020-09-11 NOTE — HISTORY OF PRESENT ILLNESS
[de-identified] : 46 y/o M with Autism, Nonverbal. Here for cerumen evaluation and ear fullness.  She feels his hearing is good.   Last Cerumen removal was June of this year.

## 2020-09-11 NOTE — ASSESSMENT
[FreeTextEntry1] : B/l cerumen impaction\par - Removed today in office\par - F/U 6 months for cerumen check. \par - Mother feels hearing is good.  Will defer audiogram.

## 2020-10-09 ENCOUNTER — APPOINTMENT (OUTPATIENT)
Dept: ELECTROPHYSIOLOGY | Facility: CLINIC | Age: 45
End: 2020-10-09
Payer: COMMERCIAL

## 2020-10-09 PROCEDURE — 93294 REM INTERROG EVL PM/LDLS PM: CPT

## 2020-10-09 PROCEDURE — 93296 REM INTERROG EVL PM/IDS: CPT

## 2020-12-01 LAB — SARS-COV-2 N GENE NPH QL NAA+PROBE: NOT DETECTED

## 2020-12-21 PROBLEM — J06.9 ACUTE UPPER RESPIRATORY INFECTION: Status: RESOLVED | Noted: 2018-01-11 | Resolved: 2020-12-21

## 2021-01-07 ENCOUNTER — APPOINTMENT (OUTPATIENT)
Dept: ELECTROPHYSIOLOGY | Facility: CLINIC | Age: 46
End: 2021-01-07
Payer: COMMERCIAL

## 2021-01-07 PROCEDURE — 93296 REM INTERROG EVL PM/IDS: CPT

## 2021-01-07 PROCEDURE — 93294 REM INTERROG EVL PM/LDLS PM: CPT

## 2021-01-08 ENCOUNTER — APPOINTMENT (OUTPATIENT)
Dept: ELECTROPHYSIOLOGY | Facility: CLINIC | Age: 46
End: 2021-01-08

## 2021-01-11 ENCOUNTER — APPOINTMENT (OUTPATIENT)
Dept: ELECTROPHYSIOLOGY | Facility: CLINIC | Age: 46
End: 2021-01-11

## 2021-02-16 ENCOUNTER — OUTPATIENT (OUTPATIENT)
Dept: OUTPATIENT SERVICES | Facility: HOSPITAL | Age: 46
LOS: 1 days | End: 2021-02-16
Payer: COMMERCIAL

## 2021-02-16 ENCOUNTER — NON-APPOINTMENT (OUTPATIENT)
Age: 46
End: 2021-02-16

## 2021-02-16 ENCOUNTER — APPOINTMENT (OUTPATIENT)
Dept: CARDIOLOGY | Facility: HOSPITAL | Age: 46
End: 2021-02-16

## 2021-02-16 VITALS
HEART RATE: 118 BPM | OXYGEN SATURATION: 97 % | BODY MASS INDEX: 27.48 KG/M2 | HEIGHT: 66 IN | WEIGHT: 171 LBS | DIASTOLIC BLOOD PRESSURE: 85 MMHG | SYSTOLIC BLOOD PRESSURE: 118 MMHG

## 2021-02-16 DIAGNOSIS — I49.5 SICK SINUS SYNDROME: ICD-10-CM

## 2021-02-16 DIAGNOSIS — I25.10 ATHEROSCLEROTIC HEART DISEASE OF NATIVE CORONARY ARTERY WITHOUT ANGINA PECTORIS: ICD-10-CM

## 2021-02-16 DIAGNOSIS — Z95.0 PRESENCE OF CARDIAC PACEMAKER: Chronic | ICD-10-CM

## 2021-02-16 PROCEDURE — G0463: CPT

## 2021-02-16 PROCEDURE — 93005 ELECTROCARDIOGRAM TRACING: CPT

## 2021-02-17 NOTE — PHYSICAL EXAM
[General Appearance - Well Developed] : well developed [Eyelids - No Xanthelasma] : the eyelids demonstrated no xanthelasmas [Normal Jugular Venous A Waves Present] : normal jugular venous A waves present [Normal Oral Mucosa] : normal oral mucosa [Normal Jugular Venous V Waves Present] : normal jugular venous V waves present [] : no respiratory distress [Respiration, Rhythm And Depth] : normal respiratory rhythm and effort [Heart Rate And Rhythm] : heart rate and rhythm were normal [Heart Sounds] : normal S1 and S2 [Abdomen Soft] : soft [Nail Clubbing] : no clubbing of the fingernails [FreeTextEntry1] : Intellectual disability, largely nonverbal

## 2021-02-17 NOTE — REVIEW OF SYSTEMS
[Fever] : no fever [Chills] : no chills [Blurry Vision] : no blurred vision [Shortness Of Breath] : no shortness of breath [Chest Pain] : no chest pain [Cough] : no cough [Abdominal Pain] : no abdominal pain [Joint Pain] : no joint pain [Dizziness] : no dizziness [Confusion] : no confusion was observed

## 2021-02-17 NOTE — REASON FOR VISIT
[Follow-Up - Clinic] : a clinic follow-up of [Pacemaker Evaluation] : pacemaker ~T evaluation ~C was performed [FreeTextEntry1] : 45 year old man has a history significant for intellectual disability and tachy yolande syndrome with placement of a single chamber VVI pacemaker in [facility] (approx 2009) s/p upgrade to dual chamber Medtronic PPM 2019, here for follow up. \par \par Pt accompanied by sister who provides history, pt is largely nonverbal. Sister denies recent history of chest pain, shortness of breath, lightheadedness of dizziness. Additional review of system cannot be obtained as pt largely non-verbal. \par \par Medications: \par None

## 2021-02-17 NOTE — ASSESSMENT
[FreeTextEntry1] : 45 year old man has a history significant for intellectual disability and tachy yolande syndrome with placement of a single chamber VVI pacemaker in [facility] (approx 2009) s/p upgrade to dual chamber Medtronic PPM 2019, here for follow up. Pt doing well overall without complaints\par \par Tachybrady Syndrome s/p PPM \par Upgraded to Medtronic dual chamber PPM 8/2019\par Most recent Pacemaker interrogation (October 2020) Atrial Pacing 45%, episodes of SVT noted\par No symptoms elicited during todays exam\par \par HLD\par Lifestyle modifications addressed \par \par RTC in 6 months. \par

## 2021-02-26 DIAGNOSIS — I49.5 SICK SINUS SYNDROME: ICD-10-CM

## 2021-03-13 ENCOUNTER — TRANSCRIPTION ENCOUNTER (OUTPATIENT)
Age: 46
End: 2021-03-13

## 2021-03-18 ENCOUNTER — NON-APPOINTMENT (OUTPATIENT)
Age: 46
End: 2021-03-18

## 2021-03-22 ENCOUNTER — LABORATORY RESULT (OUTPATIENT)
Age: 46
End: 2021-03-22

## 2021-03-22 ENCOUNTER — OUTPATIENT (OUTPATIENT)
Dept: OUTPATIENT SERVICES | Facility: HOSPITAL | Age: 46
LOS: 1 days | End: 2021-03-22
Payer: COMMERCIAL

## 2021-03-22 ENCOUNTER — APPOINTMENT (OUTPATIENT)
Dept: INTERNAL MEDICINE | Facility: CLINIC | Age: 46
End: 2021-03-22
Payer: COMMERCIAL

## 2021-03-22 VITALS
OXYGEN SATURATION: 98 % | SYSTOLIC BLOOD PRESSURE: 118 MMHG | BODY MASS INDEX: 27.32 KG/M2 | HEIGHT: 66 IN | HEART RATE: 64 BPM | WEIGHT: 170 LBS | DIASTOLIC BLOOD PRESSURE: 70 MMHG

## 2021-03-22 DIAGNOSIS — F51.01 PRIMARY INSOMNIA: ICD-10-CM

## 2021-03-22 DIAGNOSIS — R63.5 ABNORMAL WEIGHT GAIN: ICD-10-CM

## 2021-03-22 DIAGNOSIS — Z95.0 PRESENCE OF CARDIAC PACEMAKER: Chronic | ICD-10-CM

## 2021-03-22 DIAGNOSIS — I10 ESSENTIAL (PRIMARY) HYPERTENSION: ICD-10-CM

## 2021-03-22 PROCEDURE — 80053 COMPREHEN METABOLIC PANEL: CPT

## 2021-03-22 PROCEDURE — 99396 PREV VISIT EST AGE 40-64: CPT | Mod: GC

## 2021-03-22 PROCEDURE — 80061 LIPID PANEL: CPT

## 2021-03-22 PROCEDURE — 83036 HEMOGLOBIN GLYCOSYLATED A1C: CPT

## 2021-03-22 PROCEDURE — 84443 ASSAY THYROID STIM HORMONE: CPT

## 2021-03-22 PROCEDURE — 85027 COMPLETE CBC AUTOMATED: CPT

## 2021-03-22 PROCEDURE — G0463: CPT

## 2021-03-23 LAB
A1C WITH ESTIMATED AVERAGE GLUCOSE RESULT: 5.6 % — SIGNIFICANT CHANGE UP (ref 4–5.6)
ALBUMIN SERPL ELPH-MCNC: 4.5 G/DL — SIGNIFICANT CHANGE UP (ref 3.3–5)
ALP SERPL-CCNC: 81 U/L — SIGNIFICANT CHANGE UP (ref 40–120)
ALT FLD-CCNC: 16 U/L — SIGNIFICANT CHANGE UP (ref 10–45)
ANION GAP SERPL CALC-SCNC: 8 MMOL/L — SIGNIFICANT CHANGE UP (ref 5–17)
AST SERPL-CCNC: 17 U/L — SIGNIFICANT CHANGE UP (ref 10–40)
BILIRUB SERPL-MCNC: 0.4 MG/DL — SIGNIFICANT CHANGE UP (ref 0.2–1.2)
BUN SERPL-MCNC: 15 MG/DL — SIGNIFICANT CHANGE UP (ref 7–23)
CALCIUM SERPL-MCNC: 9.6 MG/DL — SIGNIFICANT CHANGE UP (ref 8.4–10.5)
CHLORIDE SERPL-SCNC: 103 MMOL/L — SIGNIFICANT CHANGE UP (ref 96–108)
CHOLEST SERPL-MCNC: 161 MG/DL — SIGNIFICANT CHANGE UP
CO2 SERPL-SCNC: 27 MMOL/L — SIGNIFICANT CHANGE UP (ref 22–31)
CREAT SERPL-MCNC: 0.96 MG/DL — SIGNIFICANT CHANGE UP (ref 0.5–1.3)
ESTIMATED AVERAGE GLUCOSE: 114 MG/DL — SIGNIFICANT CHANGE UP (ref 68–114)
GLUCOSE SERPL-MCNC: 111 MG/DL — HIGH (ref 70–99)
HCT VFR BLD CALC: 46.3 % — SIGNIFICANT CHANGE UP (ref 39–50)
HDLC SERPL-MCNC: 26 MG/DL — LOW
HGB BLD-MCNC: 14.3 G/DL — SIGNIFICANT CHANGE UP (ref 13–17)
LIPID PNL WITH DIRECT LDL SERPL: 90 MG/DL — SIGNIFICANT CHANGE UP
MCHC RBC-ENTMCNC: 26.9 PG — LOW (ref 27–34)
MCHC RBC-ENTMCNC: 30.9 GM/DL — LOW (ref 32–36)
MCV RBC AUTO: 87 FL — SIGNIFICANT CHANGE UP (ref 80–100)
NON HDL CHOLESTEROL: 135 MG/DL — HIGH
PLATELET # BLD AUTO: 219 K/UL — SIGNIFICANT CHANGE UP (ref 150–400)
POTASSIUM SERPL-MCNC: 4.9 MMOL/L — SIGNIFICANT CHANGE UP (ref 3.5–5.3)
POTASSIUM SERPL-SCNC: 4.9 MMOL/L — SIGNIFICANT CHANGE UP (ref 3.5–5.3)
PROT SERPL-MCNC: 7.1 G/DL — SIGNIFICANT CHANGE UP (ref 6–8.3)
RBC # BLD: 5.32 M/UL — SIGNIFICANT CHANGE UP (ref 4.2–5.8)
RBC # FLD: 13.4 % — SIGNIFICANT CHANGE UP (ref 10.3–14.5)
SODIUM SERPL-SCNC: 138 MMOL/L — SIGNIFICANT CHANGE UP (ref 135–145)
TRIGL SERPL-MCNC: 223 MG/DL — HIGH
TSH SERPL-MCNC: 2.35 UIU/ML — SIGNIFICANT CHANGE UP (ref 0.27–4.2)
WBC # BLD: 4.73 K/UL — SIGNIFICANT CHANGE UP (ref 3.8–10.5)
WBC # FLD AUTO: 4.73 K/UL — SIGNIFICANT CHANGE UP (ref 3.8–10.5)

## 2021-03-24 ENCOUNTER — TRANSCRIPTION ENCOUNTER (OUTPATIENT)
Age: 46
End: 2021-03-24

## 2021-03-25 DIAGNOSIS — R63.5 ABNORMAL WEIGHT GAIN: ICD-10-CM

## 2021-03-25 DIAGNOSIS — F51.01 PRIMARY INSOMNIA: ICD-10-CM

## 2021-03-25 DIAGNOSIS — Z00.00 ENCOUNTER FOR GENERAL ADULT MEDICAL EXAMINATION WITHOUT ABNORMAL FINDINGS: ICD-10-CM

## 2021-03-25 NOTE — PLAN
[FreeTextEntry1] : #insomnia\par - counseled patient and mother on proper sleep hygiene\par - will c/s psychiatry for medication management for insomnia given patient with history of ID/autism and may have component of agitation at night; will task psychologist to assist with coordination\par - would avoid trazodone given h/o tachy/yolande and cardiac side effects of trazodone\par \par #weight gain\par - dietary/exercise modification provided\par \par #tachy-yolande syndrome\par - follows with cardiology\par - s/p PPM\par - stable\par \par #cerumen impaction\par - left ear partially obstructed with cerumen\par - c/w debrox\par - following with ENT shortly\par \par #HCM\par - patient already got flu vaccine nov 2020 and also already got COVID vaccine given multiple-person household and cardiac problems\par - TDAP at next visit after covid vaccine\par - check labs: CBC, CMP, lipid, a1c, TSH\par \par RTC for next CPE or sooner PRN

## 2021-03-25 NOTE — ASSESSMENT
[FreeTextEntry1] : 45M PMH intellectual disability/autism, tachy/yolande syndrome s/p PPM, here for CPE; in his usual state of [place] with the exception of insomnia and weight gain.\par \par

## 2021-03-25 NOTE — HISTORY OF PRESENT ILLNESS
[FreeTextEntry1] : CPE, insomnia [de-identified] : 45M PMH intellectual disability/autism, tachy/yolande syndrome s/p PPM, here for CPE.\par \par #[place]\par - patient accompanied by sister Cecelia [phone] as patient with ID and autism\par - Sister reports patient has been experiencing insomnia \par - she trialed OTC melatonin 10mg with no effect\par - reports her brother is keeping other people in the house awake; it is a 5 person family household and together they take care of Mr. Reyes\par - reports Mr. Reyes watches TV and uses cell phone prior to sleeping at night\par \par #weight gain\par - sister reports patient has been gaining weight \par - reports he eats a diet high in carbohydrates; cereal, pasta. Also high in sweets like cookies\par - she reports he does exercise; he walks around the house

## 2021-03-25 NOTE — PHYSICAL EXAM
[No Acute Distress] : no acute distress [Well Nourished] : well nourished [Well Developed] : well developed [Well-Appearing] : well-appearing [Normal Sclera/Conjunctiva] : normal sclera/conjunctiva [PERRL] : pupils equal round and reactive to light [EOMI] : extraocular movements intact [Normal Outer Ear/Nose] : the outer ears and nose were normal in appearance [Normal Oropharynx] : the oropharynx was normal [No JVD] : no jugular venous distention [No Lymphadenopathy] : no lymphadenopathy [Supple] : supple [Thyroid Normal, No Nodules] : the thyroid was normal and there were no nodules present [No Respiratory Distress] : no respiratory distress  [No Accessory Muscle Use] : no accessory muscle use [Clear to Auscultation] : lungs were clear to auscultation bilaterally [Normal Rate] : normal rate  [Regular Rhythm] : with a regular rhythm [Normal S1, S2] : normal S1 and S2 [No Murmur] : no murmur heard [No Carotid Bruits] : no carotid bruits [No Abdominal Bruit] : a ~M bruit was not heard ~T in the abdomen [No Varicosities] : no varicosities [Pedal Pulses Present] : the pedal pulses are present [No Edema] : there was no peripheral edema [No Palpable Aorta] : no palpable aorta [No Extremity Clubbing/Cyanosis] : no extremity clubbing/cyanosis [Soft] : abdomen soft [Non Tender] : non-tender [Non-distended] : non-distended [No Masses] : no abdominal mass palpated [No HSM] : no HSM [Normal Bowel Sounds] : normal bowel sounds [Normal Posterior Cervical Nodes] : no posterior cervical lymphadenopathy [Normal Anterior Cervical Nodes] : no anterior cervical lymphadenopathy [No CVA Tenderness] : no CVA  tenderness [No Spinal Tenderness] : no spinal tenderness [No Joint Swelling] : no joint swelling [Grossly Normal Strength/Tone] : grossly normal strength/tone [No Rash] : no rash [Coordination Grossly Intact] : coordination grossly intact [No Focal Deficits] : no focal deficits [Normal Gait] : normal gait [Deep Tendon Reflexes (DTR)] : deep tendon reflexes were 2+ and symmetric [Normal Affect] : the affect was normal [Normal Insight/Judgement] : insight and judgment were intact [de-identified] : left cerumen partially occluding left inner ear canal. TMs clear b/l. Right ear with minimal cerumen.

## 2021-03-26 ENCOUNTER — TRANSCRIPTION ENCOUNTER (OUTPATIENT)
Age: 46
End: 2021-03-26

## 2021-03-26 ENCOUNTER — NON-APPOINTMENT (OUTPATIENT)
Age: 46
End: 2021-03-26

## 2021-04-08 ENCOUNTER — NON-APPOINTMENT (OUTPATIENT)
Age: 46
End: 2021-04-08

## 2021-04-08 ENCOUNTER — APPOINTMENT (OUTPATIENT)
Dept: ELECTROPHYSIOLOGY | Facility: CLINIC | Age: 46
End: 2021-04-08
Payer: COMMERCIAL

## 2021-04-08 PROCEDURE — 93294 REM INTERROG EVL PM/LDLS PM: CPT

## 2021-04-08 PROCEDURE — 93296 REM INTERROG EVL PM/IDS: CPT

## 2021-04-12 LAB — SARS-COV-2 N GENE NPH QL NAA+PROBE: NOT DETECTED

## 2021-04-23 ENCOUNTER — APPOINTMENT (OUTPATIENT)
Dept: OTOLARYNGOLOGY | Facility: CLINIC | Age: 46
End: 2021-04-23
Payer: COMMERCIAL

## 2021-04-23 VITALS
BODY MASS INDEX: 27.32 KG/M2 | DIASTOLIC BLOOD PRESSURE: 82 MMHG | HEART RATE: 58 BPM | WEIGHT: 170 LBS | SYSTOLIC BLOOD PRESSURE: 119 MMHG | TEMPERATURE: 97.2 F | HEIGHT: 66 IN

## 2021-04-23 DIAGNOSIS — H61.23 IMPACTED CERUMEN, BILATERAL: ICD-10-CM

## 2021-04-23 DIAGNOSIS — H93.8X3 OTHER SPECIFIED DISORDERS OF EAR, BILATERAL: ICD-10-CM

## 2021-04-23 PROCEDURE — 69210 REMOVE IMPACTED EAR WAX UNI: CPT

## 2021-04-23 PROCEDURE — 99214 OFFICE O/P EST MOD 30 MIN: CPT | Mod: 25

## 2021-04-23 PROCEDURE — 99072 ADDL SUPL MATRL&STAF TM PHE: CPT

## 2021-04-23 NOTE — PHYSICAL EXAM
[Normal] : tympanic membranes are normal in both ears [de-identified] : left moderate cerumen, right mild cerumen

## 2021-04-23 NOTE — HISTORY OF PRESENT ILLNESS
[de-identified] : 46 y/o M with Autism, nonverbal.  here for Cerumen evaluation.  Mother notes no d/c and not c/o pain.

## 2021-04-23 NOTE — ASSESSMENT
[FreeTextEntry1] : Cerumen impaction\par - sister helped with hx as Pt. has Autism and is nonverbal \par - Cerumen removed in office today\par - F/U 6 months for recleaning\par \par \par I personally saw and examined Mr. DANIEL REYES in detail this visit today. I personally reviewed the HPI, PMH, FH, SH, ROS and medications/allergies. I have spoken to RENARD Guo regarding the history and have personally determined the assessment and plan of care, and documented this myself. I was present and participated in all key portions of the encounter and all procedures noted above. I have made changes in the body of the note where appropriate.\par \par Attesting Faculty: See Attending Signature Below

## 2021-07-08 ENCOUNTER — APPOINTMENT (OUTPATIENT)
Dept: ELECTROPHYSIOLOGY | Facility: CLINIC | Age: 46
End: 2021-07-08
Payer: COMMERCIAL

## 2021-07-08 ENCOUNTER — NON-APPOINTMENT (OUTPATIENT)
Age: 46
End: 2021-07-08

## 2021-07-08 PROCEDURE — 93296 REM INTERROG EVL PM/IDS: CPT

## 2021-07-08 PROCEDURE — 93294 REM INTERROG EVL PM/LDLS PM: CPT

## 2021-10-07 ENCOUNTER — NON-APPOINTMENT (OUTPATIENT)
Age: 46
End: 2021-10-07

## 2021-10-07 ENCOUNTER — APPOINTMENT (OUTPATIENT)
Dept: ELECTROPHYSIOLOGY | Facility: CLINIC | Age: 46
End: 2021-10-07
Payer: COMMERCIAL

## 2021-10-07 PROCEDURE — 93296 REM INTERROG EVL PM/IDS: CPT

## 2021-10-07 PROCEDURE — 93294 REM INTERROG EVL PM/LDLS PM: CPT

## 2021-10-18 ENCOUNTER — APPOINTMENT (OUTPATIENT)
Dept: INTERNAL MEDICINE | Facility: CLINIC | Age: 46
End: 2021-10-18

## 2021-11-05 ENCOUNTER — NON-APPOINTMENT (OUTPATIENT)
Age: 46
End: 2021-11-05

## 2021-11-08 ENCOUNTER — MED ADMIN CHARGE (OUTPATIENT)
Age: 46
End: 2021-11-08

## 2021-11-08 ENCOUNTER — APPOINTMENT (OUTPATIENT)
Dept: INTERNAL MEDICINE | Facility: CLINIC | Age: 46
End: 2021-11-08
Payer: COMMERCIAL

## 2021-11-08 ENCOUNTER — OUTPATIENT (OUTPATIENT)
Dept: OUTPATIENT SERVICES | Facility: HOSPITAL | Age: 46
LOS: 1 days | End: 2021-11-08
Payer: COMMERCIAL

## 2021-11-08 VITALS
OXYGEN SATURATION: 98 % | BODY MASS INDEX: 26.2 KG/M2 | WEIGHT: 163 LBS | DIASTOLIC BLOOD PRESSURE: 70 MMHG | HEIGHT: 66 IN | SYSTOLIC BLOOD PRESSURE: 110 MMHG | HEART RATE: 63 BPM

## 2021-11-08 DIAGNOSIS — Z78.9 OTHER SPECIFIED HEALTH STATUS: ICD-10-CM

## 2021-11-08 DIAGNOSIS — Z95.0 PRESENCE OF CARDIAC PACEMAKER: Chronic | ICD-10-CM

## 2021-11-08 DIAGNOSIS — I10 ESSENTIAL (PRIMARY) HYPERTENSION: ICD-10-CM

## 2021-11-08 PROCEDURE — G0463: CPT | Mod: 25

## 2021-11-08 PROCEDURE — 90688 IIV4 VACCINE SPLT 0.5 ML IM: CPT

## 2021-11-08 PROCEDURE — 99213 OFFICE O/P EST LOW 20 MIN: CPT | Mod: GE

## 2021-11-08 PROCEDURE — G0008: CPT

## 2021-11-08 RX ORDER — ASPIRIN 81 MG
6.5 TABLET, DELAYED RELEASE (ENTERIC COATED) ORAL
Qty: 1 | Refills: 1 | Status: DISCONTINUED | COMMUNITY
Start: 2020-03-02 | End: 2021-11-08

## 2021-11-08 NOTE — PHYSICAL EXAM
[Normal] : soft, non-tender, non-distended, no masses palpated, no HSM and normal bowel sounds [Coordination Grossly Intact] : coordination grossly intact [No Focal Deficits] : no focal deficits [Normal Gait] : normal gait [Normal Affect] : the affect was normal

## 2021-11-09 NOTE — ASSESSMENT
[FreeTextEntry1] : 45 y/o M w/ PMH of intellectual disability, autism, nonverbal, tachy/yolande syndrome s/p PPM comes in for a CPE.\par \par #Insomnia\par Benadryl per psych recs seems to provide some help with patient falling asleep.\par - Can continue Benadryl given patient's cardiac history limiting other medical choices and family's reluctance to start other medications; counseled patient's sister about importance of not using too much Benadryl and to try to limit usage only when necessary\par \par #HCM\par - Patient given both influenza and Tdap today\par - Patient has received both doses of Covid vaccine as well as a booster shot\par - Patient recently turned 46. Counseled patient's sister on recommendation for colonoscopy. Patient's sister said she would discuss colonoscopy with family before deciding how they would like to pursue colorectal cancer surveillance\par - Gave patient's sister letter explaining patient's medical history and need for daily help with communication\par - Had recent set of labs done in March. No need for new bloodwork at this time.\par

## 2021-11-09 NOTE — REVIEW OF SYSTEMS
[Insomnia] : insomnia [Fever] : no fever [Chills] : no chills [Chest Pain] : no chest pain [Palpitations] : no palpitations [Shortness Of Breath] : no shortness of breath [Abdominal Pain] : no abdominal pain [Nausea] : no nausea [Diarrhea] : no diarrhea [Dysuria] : no dysuria [Hematuria] : no hematuria [Headache] : no headache [Dizziness] : no dizziness [de-identified] : sleeps about 4-5 hours a night

## 2021-11-09 NOTE — HISTORY OF PRESENT ILLNESS
[Family Member] : family member [FreeTextEntry1] : CPE [de-identified] : HPI: 45 y/o M w/ PMH of intellectual disability, autism, nonverbal, tachy/yolande syndrome s/p PPM comes in for a CPE.\par \par Since patient is nonverbal with intellectual disability, sister communicated on behalf ot the patient. Sister has no concerns today and says that patient has been doing well. She says that OTC benadryl 25 mg that was recommended by psych has been helping patient fall asleep instead of playing with his toys and has been using benadryl daily for this purpose.\par \par Patient has also been following his cardiologist consistently and has been successful losing weight with dietary modifications by cutting down on sweets and cake.\par \par Patient's sister requesting for a letter explaining patient's medical condition given that they have been having issues explaining to people who request patient to sign forms that he doesn't have the ability to do so.

## 2021-11-15 DIAGNOSIS — Z78.9 OTHER SPECIFIED HEALTH STATUS: ICD-10-CM

## 2021-11-15 DIAGNOSIS — F84.0 AUTISTIC DISORDER: ICD-10-CM

## 2021-11-15 DIAGNOSIS — G47.00 INSOMNIA, UNSPECIFIED: ICD-10-CM

## 2021-11-15 DIAGNOSIS — Z23 ENCOUNTER FOR IMMUNIZATION: ICD-10-CM

## 2021-12-20 LAB — SARS-COV-2 N GENE NPH QL NAA+PROBE: NOT DETECTED

## 2022-01-06 ENCOUNTER — APPOINTMENT (OUTPATIENT)
Dept: ELECTROPHYSIOLOGY | Facility: CLINIC | Age: 47
End: 2022-01-06
Payer: COMMERCIAL

## 2022-01-06 ENCOUNTER — NON-APPOINTMENT (OUTPATIENT)
Age: 47
End: 2022-01-06

## 2022-01-06 PROCEDURE — 93296 REM INTERROG EVL PM/IDS: CPT

## 2022-01-06 PROCEDURE — 93294 REM INTERROG EVL PM/LDLS PM: CPT

## 2022-04-07 ENCOUNTER — APPOINTMENT (OUTPATIENT)
Dept: ELECTROPHYSIOLOGY | Facility: CLINIC | Age: 47
End: 2022-04-07

## 2022-04-07 ENCOUNTER — NON-APPOINTMENT (OUTPATIENT)
Age: 47
End: 2022-04-07

## 2022-10-31 ENCOUNTER — APPOINTMENT (OUTPATIENT)
Dept: ELECTROPHYSIOLOGY | Facility: CLINIC | Age: 47
End: 2022-10-31

## 2022-10-31 ENCOUNTER — NON-APPOINTMENT (OUTPATIENT)
Age: 47
End: 2022-10-31

## 2022-10-31 PROCEDURE — 93294 REM INTERROG EVL PM/LDLS PM: CPT

## 2022-10-31 PROCEDURE — 93296 REM INTERROG EVL PM/IDS: CPT

## 2023-01-30 ENCOUNTER — NON-APPOINTMENT (OUTPATIENT)
Age: 48
End: 2023-01-30

## 2023-01-30 ENCOUNTER — APPOINTMENT (OUTPATIENT)
Dept: ELECTROPHYSIOLOGY | Facility: CLINIC | Age: 48
End: 2023-01-30
Payer: MEDICAID

## 2023-01-30 PROCEDURE — 93294 REM INTERROG EVL PM/LDLS PM: CPT

## 2023-01-30 PROCEDURE — 93296 REM INTERROG EVL PM/IDS: CPT

## 2023-04-03 ENCOUNTER — APPOINTMENT (OUTPATIENT)
Dept: INTERNAL MEDICINE | Facility: CLINIC | Age: 48
End: 2023-04-03
Payer: MEDICAID

## 2023-04-03 ENCOUNTER — OUTPATIENT (OUTPATIENT)
Dept: OUTPATIENT SERVICES | Facility: HOSPITAL | Age: 48
LOS: 1 days | End: 2023-04-03
Payer: MEDICAID

## 2023-04-03 ENCOUNTER — MED ADMIN CHARGE (OUTPATIENT)
Age: 48
End: 2023-04-03

## 2023-04-03 VITALS
HEART RATE: 74 BPM | OXYGEN SATURATION: 98 % | DIASTOLIC BLOOD PRESSURE: 80 MMHG | BODY MASS INDEX: 26.03 KG/M2 | HEIGHT: 66 IN | SYSTOLIC BLOOD PRESSURE: 122 MMHG | WEIGHT: 162 LBS

## 2023-04-03 DIAGNOSIS — G47.00 INSOMNIA, UNSPECIFIED: ICD-10-CM

## 2023-04-03 DIAGNOSIS — Z95.0 PRESENCE OF CARDIAC PACEMAKER: Chronic | ICD-10-CM

## 2023-04-03 DIAGNOSIS — I10 ESSENTIAL (PRIMARY) HYPERTENSION: ICD-10-CM

## 2023-04-03 DIAGNOSIS — Z23 ENCOUNTER FOR IMMUNIZATION: ICD-10-CM

## 2023-04-03 PROCEDURE — 80053 COMPREHEN METABOLIC PANEL: CPT

## 2023-04-03 PROCEDURE — 83036 HEMOGLOBIN GLYCOSYLATED A1C: CPT

## 2023-04-03 PROCEDURE — 85025 COMPLETE CBC W/AUTO DIFF WBC: CPT

## 2023-04-03 PROCEDURE — T1013: CPT

## 2023-04-03 PROCEDURE — ZZZZZ: CPT

## 2023-04-03 PROCEDURE — 84443 ASSAY THYROID STIM HORMONE: CPT

## 2023-04-03 PROCEDURE — 87389 HIV-1 AG W/HIV-1&-2 AB AG IA: CPT

## 2023-04-03 PROCEDURE — G0463: CPT

## 2023-04-04 LAB
ALBUMIN SERPL ELPH-MCNC: 4.5 G/DL
ALP BLD-CCNC: 90 U/L
ALT SERPL-CCNC: 20 U/L
ANION GAP SERPL CALC-SCNC: 12 MMOL/L
AST SERPL-CCNC: 27 U/L
BASOPHILS # BLD AUTO: 0.06 K/UL
BASOPHILS NFR BLD AUTO: 1.3 %
BILIRUB SERPL-MCNC: 0.5 MG/DL
BUN SERPL-MCNC: 14 MG/DL
CALCIUM SERPL-MCNC: 9.6 MG/DL
CHLORIDE SERPL-SCNC: 103 MMOL/L
CHOLEST SERPL-MCNC: 172 MG/DL
CHOLEST/HDLC SERPL: 4.7 RATIO
CO2 SERPL-SCNC: 24 MMOL/L
CREAT SERPL-MCNC: 0.9 MG/DL
EGFR: 107 ML/MIN/1.73M2
EOSINOPHIL # BLD AUTO: 0.17 K/UL
EOSINOPHIL NFR BLD AUTO: 3.7 %
ESTIMATED AVERAGE GLUCOSE: 114 MG/DL
GLUCOSE SERPL-MCNC: 103 MG/DL
HBA1C MFR BLD HPLC: 5.6 %
HCT VFR BLD CALC: 44.6 %
HDLC SERPL-MCNC: 37 MG/DL
HGB BLD-MCNC: 14.2 G/DL
HIV1+2 AB SPEC QL IA.RAPID: NONREACTIVE
IMM GRANULOCYTES NFR BLD AUTO: 0.4 %
LDLC SERPL CALC-MCNC: 115 MG/DL
LYMPHOCYTES # BLD AUTO: 1.27 K/UL
LYMPHOCYTES NFR BLD AUTO: 27.7 %
MAN DIFF?: NORMAL
MCHC RBC-ENTMCNC: 27 PG
MCHC RBC-ENTMCNC: 31.8 GM/DL
MCV RBC AUTO: 84.8 FL
MONOCYTES # BLD AUTO: 0.3 K/UL
MONOCYTES NFR BLD AUTO: 6.6 %
NEUTROPHILS # BLD AUTO: 2.76 K/UL
NEUTROPHILS NFR BLD AUTO: 60.3 %
NONHDLC SERPL-MCNC: 136 MG/DL
PLATELET # BLD AUTO: 199 K/UL
POTASSIUM SERPL-SCNC: 4.6 MMOL/L
PROT SERPL-MCNC: 7.4 G/DL
RBC # BLD: 5.26 M/UL
RBC # FLD: 13.4 %
SODIUM SERPL-SCNC: 139 MMOL/L
TRIGL SERPL-MCNC: 105 MG/DL
TSH SERPL-ACNC: 1.73 UIU/ML
WBC # FLD AUTO: 4.58 K/UL

## 2023-04-06 NOTE — REVIEW OF SYSTEMS
[Fever] : no fever [Chills] : no chills [Discharge] : no discharge [Pain] : no pain [Redness] : no redness [Earache] : no earache [Nosebleeds] : no nosebleeds [Nasal Discharge] : no nasal discharge [Sore Throat] : no sore throat [Chest Pain] : no chest pain [Palpitations] : no palpitations [Orthopena] : no orthopnea [Shortness Of Breath] : no shortness of breath [Wheezing] : no wheezing [Cough] : no cough [Abdominal Pain] : no abdominal pain [Nausea] : no nausea [Constipation] : no constipation [Diarrhea] : no diarrhea [Vomiting] : no vomiting [Heartburn] : no heartburn [Dysuria] : no dysuria [Itching] : no itching [Skin Rash] : no skin rash [Headache] : no headache [Fainting] : no fainting [Insomnia] : no insomnia

## 2023-04-06 NOTE — ASSESSMENT
[FreeTextEntry1] : Daniel Reyes 47M hx intellectual disability, autism, nonverbal, and tachy/yolande syndrome (s/p PPM) comes in for a CPE. Last visit 11/2021 for CPE.\par \par #HCM\par - RTC in 1 year\par - Cancer screening: [to be discussed further at next appointment] colonoscopy\par - Other screening: [X] HIV ordered, [done] PHQ2/9\par - Vax: [given today] Flu, [UTD] Tdap, [counseled to obtain outpatient] Covid bivalent booster\par - Routine labs: [X] CBC, [X] CMP, [X] Lipid panel, [X] A1c, [X] TSH ordered\par - patient brought in citizenship form today, will fill out and return to patient by mail to: [address] [place] [id number]\par \par SANDI HUMMEL MD, PhD - PGY-1\par 04/03/2023

## 2023-04-06 NOTE — INTERPRETER SERVICES
[[employer] ] : provided by [employer]   [Time Spent: ____ minutes] : Total time spent using  services: [unfilled] minutes. The patient's primary language is not English thus required  services. [Interpreters_IDNumber] : [id number] [Interpreters_FullName] : Abby [TWNoteComboBox1] : Lao

## 2023-04-06 NOTE — HEALTH RISK ASSESSMENT
[1 or 2 (0 pts)] : 1 or 2 (0 points) [Never (0 pts)] : Never (0 points) [No] : In the past 12 months have you used drugs other than those required for medical reasons? No [No falls in past year] : Patient reported no falls in the past year [0] : 2) Feeling down, depressed, or hopeless: Not at all (0) 4 [PHQ-2 Negative - No further assessment needed] : PHQ-2 Negative - No further assessment needed [Audit-CScore] : 0 [de-identified] : walks outside [[id number]] : 0

## 2023-04-06 NOTE — PHYSICAL EXAM
[No Acute Distress] : no acute distress [Well Nourished] : well nourished [Well Developed] : well developed [Well-Appearing] : well-appearing [Normal Sclera/Conjunctiva] : normal sclera/conjunctiva [EOMI] : extraocular movements intact [Normal Outer Ear/Nose] : the outer ears and nose were normal in appearance [Normal Oropharynx] : the oropharynx was normal [Normal TMs] : both tympanic membranes were normal [No JVD] : no jugular venous distention [No Lymphadenopathy] : no lymphadenopathy [Supple] : supple [Thyroid Normal, No Nodules] : the thyroid was normal and there were no nodules present [No Respiratory Distress] : no respiratory distress  [No Accessory Muscle Use] : no accessory muscle use [Clear to Auscultation] : lungs were clear to auscultation bilaterally [Normal Rate] : normal rate  [Regular Rhythm] : with a regular rhythm [Normal S1, S2] : normal S1 and S2 [No Murmur] : no murmur heard [No Varicosities] : no varicosities [No Edema] : there was no peripheral edema [Soft] : abdomen soft [Non Tender] : non-tender [Non-distended] : non-distended [No Masses] : no abdominal mass palpated [No HSM] : no HSM [Normal Bowel Sounds] : normal bowel sounds [Normal Posterior Cervical Nodes] : no posterior cervical lymphadenopathy [Normal Anterior Cervical Nodes] : no anterior cervical lymphadenopathy [No Joint Swelling] : no joint swelling [Grossly Normal Strength/Tone] : grossly normal strength/tone [No Rash] : no rash [No Focal Deficits] : no focal deficits [de-identified] : cerumen bilaterally [de-identified] : PPM in left upper chest [de-identified] : non verbal 2/2 autism

## 2023-04-06 NOTE — HISTORY OF PRESENT ILLNESS
[Family Member] : family member [FreeTextEntry1] : annual physical [de-identified] : Daniel Reyes 47M hx intellectual disability, autism, nonverbal, and tachy/yolande syndrome (s/p PPM) comes in for a CPE. Last visit 11/2021 for CPE.\par \par Patient comes with his sister, and he lives with his other sister who is his primary caretaker. Had Covid infection in November. Also had a respiratory infection 2 weeks ago, did not get tested but symptoms resolved on their own.\par \par #Insomnia\par - Benadryl per psych recs seems to provide some help with patient falling asleep.\par - Can continue Benadryl given patient's cardiac history limiting other medical choices and family's reluctance to start other medications; though patient hasn't needed lately and is only taking melatonin\par \par # HCM\par - Diet: beans, rice, vegetables, avoiding sugar\par - Exercise: walks around the neighborhood\par - Patient given last visit influenza and Tdap 2021, received flu shot in clinic today, will get covid bivalent booster outpatient. Patient has received primary series Covid vaccine as well as a one booster shot\par - family to discuss colonoscopy vs other testing for colon cancer before deciding surveillance procedure\par - Had recent set of labs done in March 2021, will get new routine labs today

## 2023-04-07 DIAGNOSIS — Z23 ENCOUNTER FOR IMMUNIZATION: ICD-10-CM

## 2023-04-07 DIAGNOSIS — G47.00 INSOMNIA, UNSPECIFIED: ICD-10-CM

## 2023-04-07 DIAGNOSIS — Z00.00 ENCOUNTER FOR GENERAL ADULT MEDICAL EXAMINATION WITHOUT ABNORMAL FINDINGS: ICD-10-CM

## 2023-04-18 ENCOUNTER — NON-APPOINTMENT (OUTPATIENT)
Age: 48
End: 2023-04-18

## 2023-04-18 ENCOUNTER — APPOINTMENT (OUTPATIENT)
Dept: CARDIOLOGY | Facility: HOSPITAL | Age: 48
End: 2023-04-18

## 2023-04-18 ENCOUNTER — OUTPATIENT (OUTPATIENT)
Dept: OUTPATIENT SERVICES | Facility: HOSPITAL | Age: 48
LOS: 1 days | End: 2023-04-18
Payer: MEDICAID

## 2023-04-18 VITALS
BODY MASS INDEX: 25.71 KG/M2 | HEIGHT: 66 IN | OXYGEN SATURATION: 99 % | SYSTOLIC BLOOD PRESSURE: 127 MMHG | WEIGHT: 160 LBS | HEART RATE: 65 BPM | DIASTOLIC BLOOD PRESSURE: 87 MMHG

## 2023-04-18 DIAGNOSIS — Z95.0 PRESENCE OF CARDIAC PACEMAKER: ICD-10-CM

## 2023-04-18 DIAGNOSIS — I25.10 ATHEROSCLEROTIC HEART DISEASE OF NATIVE CORONARY ARTERY WITHOUT ANGINA PECTORIS: ICD-10-CM

## 2023-04-18 DIAGNOSIS — Z95.0 PRESENCE OF CARDIAC PACEMAKER: Chronic | ICD-10-CM

## 2023-04-18 PROCEDURE — 93005 ELECTROCARDIOGRAM TRACING: CPT

## 2023-04-18 PROCEDURE — G0463: CPT

## 2023-04-21 NOTE — REASON FOR VISIT
[Symptom and Test Evaluation] : symptom and test evaluation [FreeTextEntry1] : 47 year old man has a history significant for intellectual disability and tachy yolande syndrome with placement of a single chamber VVI pacemaker in [facility] (approx 2009) s/p upgrade to dual chamber Medtronic PPM 2019, here for follow up. \par \par Pt accompanied by sister who provides history, pt is largely nonverbal. Sister denies recent history of chest pain, shortness of breath, lightheadedness of dizziness. Additional review of system cannot be obtained as pt largely non-verbal. \par \par Medications: \par None

## 2023-04-21 NOTE — ASSESSMENT
[FreeTextEntry1] : 47 year old man has a history significant for intellectual disability and tachy yolande syndrome with placement of a single chamber VVI pacemaker in [facility] (approx 2009) s/p upgrade to dual chamber Medtronic PPM 2019, here for follow up. Pt doing well overall without complaints\par \par Tachybrady Syndrome s/p PPM \par Upgraded to Medtronic dual chamber PPM 8/2019\par Most recent Pacemaker interrogation (Jan 2023)  Atrial Pacing 45% \par No symptoms elicited during today's exam\par \par HLD\par Lifestyle modifications addressed \par \par \par

## 2023-05-02 DIAGNOSIS — Z95.0 PRESENCE OF CARDIAC PACEMAKER: ICD-10-CM

## 2023-05-10 ENCOUNTER — APPOINTMENT (OUTPATIENT)
Dept: INTERNAL MEDICINE | Facility: CLINIC | Age: 48
End: 2023-05-10
Payer: MEDICAID

## 2023-05-10 ENCOUNTER — OUTPATIENT (OUTPATIENT)
Dept: OUTPATIENT SERVICES | Facility: HOSPITAL | Age: 48
LOS: 1 days | End: 2023-05-10
Payer: MEDICAID

## 2023-05-10 VITALS
WEIGHT: 161 LBS | BODY MASS INDEX: 25.88 KG/M2 | SYSTOLIC BLOOD PRESSURE: 116 MMHG | HEIGHT: 66 IN | DIASTOLIC BLOOD PRESSURE: 80 MMHG | HEART RATE: 65 BPM | OXYGEN SATURATION: 99 %

## 2023-05-10 DIAGNOSIS — Z95.0 PRESENCE OF CARDIAC PACEMAKER: Chronic | ICD-10-CM

## 2023-05-10 DIAGNOSIS — F84.0 AUTISTIC DISORDER: ICD-10-CM

## 2023-05-10 DIAGNOSIS — E78.5 HYPERLIPIDEMIA, UNSPECIFIED: ICD-10-CM

## 2023-05-10 DIAGNOSIS — Z00.00 ENCOUNTER FOR GENERAL ADULT MEDICAL EXAMINATION W/OUT ABNORMAL FINDINGS: ICD-10-CM

## 2023-05-10 DIAGNOSIS — F79 UNSPECIFIED INTELLECTUAL DISABILITIES: ICD-10-CM

## 2023-05-10 DIAGNOSIS — I10 ESSENTIAL (PRIMARY) HYPERTENSION: ICD-10-CM

## 2023-05-10 PROCEDURE — 99213 OFFICE O/P EST LOW 20 MIN: CPT | Mod: GE

## 2023-05-10 PROCEDURE — G0463: CPT

## 2023-05-10 NOTE — PHYSICAL EXAM
[No Acute Distress] : no acute distress [Well Nourished] : well nourished [Well Developed] : well developed [Well-Appearing] : well-appearing [Normal Sclera/Conjunctiva] : normal sclera/conjunctiva [PERRL] : pupils equal round and reactive to light [EOMI] : extraocular movements intact [Normal Outer Ear/Nose] : the outer ears and nose were normal in appearance [No Respiratory Distress] : no respiratory distress  [No Accessory Muscle Use] : no accessory muscle use [Clear to Auscultation] : lungs were clear to auscultation bilaterally [Normal Rate] : normal rate  [Regular Rhythm] : with a regular rhythm [Normal S1, S2] : normal S1 and S2 [No Edema] : there was no peripheral edema

## 2023-05-10 NOTE — HISTORY OF PRESENT ILLNESS
[FreeTextEntry8] : 47M hx intellectual disability, autism, nonverbal, and tachy/yolande syndrome (s/p PPM) comes in for a APA regarding immigration form.\par \par Otherwise no other complaints.\par

## 2023-05-10 NOTE — ASSESSMENT
[FreeTextEntry1] : 47M hx intellectual disability, autism, nonverbal, and tachy/yolande syndrome (s/p PPM) comes in for a APA regarding immigration form.\par \par #HCM\par -immigration form will mail to patient's family once completed and scan into chart\par \par RTC for next CPE.\par \par d/w Dr. Coates

## 2023-05-12 ENCOUNTER — APPOINTMENT (OUTPATIENT)
Dept: ELECTROPHYSIOLOGY | Facility: CLINIC | Age: 48
End: 2023-05-12

## 2023-05-31 ENCOUNTER — APPOINTMENT (OUTPATIENT)
Dept: ELECTROPHYSIOLOGY | Facility: CLINIC | Age: 48
End: 2023-05-31
Payer: MEDICAID

## 2023-05-31 VITALS
HEIGHT: 66 IN | SYSTOLIC BLOOD PRESSURE: 125 MMHG | OXYGEN SATURATION: 98 % | WEIGHT: 160 LBS | BODY MASS INDEX: 25.71 KG/M2 | DIASTOLIC BLOOD PRESSURE: 85 MMHG | HEART RATE: 73 BPM

## 2023-05-31 DIAGNOSIS — I47.29 OTHER VENTRICULAR TACHYCARDIA: ICD-10-CM

## 2023-05-31 PROCEDURE — 93280 PM DEVICE PROGR EVAL DUAL: CPT

## 2023-05-31 PROCEDURE — 93000 ELECTROCARDIOGRAM COMPLETE: CPT | Mod: 59

## 2023-06-19 ENCOUNTER — NON-APPOINTMENT (OUTPATIENT)
Age: 48
End: 2023-06-19

## 2023-08-30 NOTE — DISCHARGE NOTE PROVIDER - CARE PROVIDER_API CALL
Tima Ramos (MD)  Cardiac Electrophysiology; Cardiology; Internal Medicine  [address] [place] [address]  Phone: [phone]  Follow Up Time: Yes

## 2023-09-01 ENCOUNTER — APPOINTMENT (OUTPATIENT)
Dept: ELECTROPHYSIOLOGY | Facility: CLINIC | Age: 48
End: 2023-09-01
Payer: MEDICAID

## 2023-09-01 ENCOUNTER — NON-APPOINTMENT (OUTPATIENT)
Age: 48
End: 2023-09-01

## 2023-09-01 PROCEDURE — 93294 REM INTERROG EVL PM/LDLS PM: CPT

## 2023-09-01 PROCEDURE — 93296 REM INTERROG EVL PM/IDS: CPT

## 2023-09-15 ENCOUNTER — OUTPATIENT (OUTPATIENT)
Dept: OUTPATIENT SERVICES | Facility: HOSPITAL | Age: 48
LOS: 1 days | End: 2023-09-15

## 2023-09-15 ENCOUNTER — APPOINTMENT (OUTPATIENT)
Dept: CV DIAGNOSITCS | Facility: HOSPITAL | Age: 48
End: 2023-09-15

## 2023-09-15 DIAGNOSIS — F79 UNSPECIFIED INTELLECTUAL DISABILITIES: ICD-10-CM

## 2023-09-15 DIAGNOSIS — E78.5 HYPERLIPIDEMIA, UNSPECIFIED: ICD-10-CM

## 2023-09-15 DIAGNOSIS — Z95.0 PRESENCE OF CARDIAC PACEMAKER: Chronic | ICD-10-CM

## 2023-09-15 DIAGNOSIS — F84.0 AUTISTIC DISORDER: ICD-10-CM

## 2023-12-19 ENCOUNTER — APPOINTMENT (OUTPATIENT)
Dept: CARDIOLOGY | Facility: HOSPITAL | Age: 48
End: 2023-12-19

## 2023-12-19 ENCOUNTER — APPOINTMENT (OUTPATIENT)
Dept: ELECTROPHYSIOLOGY | Facility: CLINIC | Age: 48
End: 2023-12-19

## 2023-12-19 DIAGNOSIS — E78.5 HYPERLIPIDEMIA, UNSPECIFIED: ICD-10-CM

## 2023-12-19 NOTE — ASSESSMENT
[FreeTextEntry1] : 48 year old man has a history significant for intellectual disability and tachy yolande syndrome with placement of a single chamber VVI pacemaker in [facility] (approx 2009) s/p upgrade to dual chamber Medtronic PPM 2019, here for follow up.   #Tachybrady Syndrome s/p PPM - Upgraded to Medtronic dual chamber PPM 8/2019 - Most recent Pacemaker interrogation (Jan 2023) Atrial Pacing 45% - No symptoms elicited during today's exam  #HLD - Lifestyle modifications addressed

## 2023-12-19 NOTE — REASON FOR VISIT
[FreeTextEntry1] : 48 year old man has a history significant for intellectual disability and tachy yolande syndrome with placement of a single chamber VVI pacemaker in [facility] (approx 2009) s/p upgrade to dual chamber Medtronic PPM 2019, here for follow up.  LCV 4/2023: Pt accompanied by sister who provides history, pt is largely nonverbal. Sister denies recent history of chest pain, shortness of breath, lightheadedness of dizziness. Additional review of system cannot be obtained as pt largely non-verbal.  IE: Following with medicine  Patient reports

## 2024-03-19 ENCOUNTER — APPOINTMENT (OUTPATIENT)
Dept: ELECTROPHYSIOLOGY | Facility: CLINIC | Age: 49
End: 2024-03-19
Payer: MEDICAID

## 2024-03-19 ENCOUNTER — NON-APPOINTMENT (OUTPATIENT)
Age: 49
End: 2024-03-19

## 2024-03-19 PROCEDURE — 93296 REM INTERROG EVL PM/IDS: CPT

## 2024-03-19 PROCEDURE — 93294 REM INTERROG EVL PM/LDLS PM: CPT

## 2024-06-18 ENCOUNTER — NON-APPOINTMENT (OUTPATIENT)
Age: 49
End: 2024-06-18

## 2024-06-18 ENCOUNTER — APPOINTMENT (OUTPATIENT)
Dept: ELECTROPHYSIOLOGY | Facility: CLINIC | Age: 49
End: 2024-06-18
Payer: MEDICAID

## 2024-06-18 PROCEDURE — 93296 REM INTERROG EVL PM/IDS: CPT

## 2024-06-18 PROCEDURE — 93294 REM INTERROG EVL PM/LDLS PM: CPT

## 2024-08-05 ENCOUNTER — OUTPATIENT (OUTPATIENT)
Dept: OUTPATIENT SERVICES | Facility: HOSPITAL | Age: 49
LOS: 1 days | End: 2024-08-05
Payer: MEDICAID

## 2024-08-05 ENCOUNTER — APPOINTMENT (OUTPATIENT)
Dept: INTERNAL MEDICINE | Facility: CLINIC | Age: 49
End: 2024-08-05

## 2024-08-05 DIAGNOSIS — Z95.0 PRESENCE OF CARDIAC PACEMAKER: Chronic | ICD-10-CM

## 2024-08-05 DIAGNOSIS — I10 ESSENTIAL (PRIMARY) HYPERTENSION: ICD-10-CM

## 2024-08-05 PROBLEM — Z09 HOSPITAL DISCHARGE FOLLOW-UP: Status: RESOLVED | Noted: 2019-10-17 | Resolved: 2024-08-05

## 2024-08-05 PROBLEM — H61.23 BILATERAL IMPACTED CERUMEN: Status: RESOLVED | Noted: 2020-03-02 | Resolved: 2024-08-05

## 2024-08-05 PROBLEM — H93.8X3 EAR FULLNESS, BILATERAL: Status: RESOLVED | Noted: 2020-06-10 | Resolved: 2024-08-05

## 2024-08-05 PROCEDURE — 99401 PREV MED CNSL INDIV APPRX 15: CPT

## 2024-08-05 PROCEDURE — G0463: CPT

## 2024-08-05 PROCEDURE — G0136: CPT

## 2024-08-05 PROCEDURE — 99396 PREV VISIT EST AGE 40-64: CPT | Mod: GC,25

## 2024-08-05 PROCEDURE — 99213 OFFICE O/P EST LOW 20 MIN: CPT | Mod: GC,25

## 2024-08-05 PROCEDURE — G2211 COMPLEX E/M VISIT ADD ON: CPT | Mod: NC

## 2024-08-05 NOTE — HEALTH RISK ASSESSMENT
[Good] : ~his/her~  mood as  good [1 or 2 (0 pts)] : 1 or 2 (0 points) [Never (0 pts)] : Never (0 points) [No] : In the past 12 months have you used drugs other than those required for medical reasons? No [No falls in past year] : Patient reported no falls in the past year [0] : 2) Feeling down, depressed, or hopeless: Not at all (0) [PHQ-2 Negative - No further assessment needed] : PHQ-2 Negative - No further assessment needed [Never] : Never [Hepatitis C test offered] : Hepatitis C test offered [Audit-CScore] : 0 [ORE6Gksme] : 0

## 2024-08-05 NOTE — ASSESSMENT
[FreeTextEntry1] : 49M hx intellectual disability/autism (non-verbal), tachy/yolande syndrome (s/p PPM).  #HCM - RTC in 6 months - 1 year for general check up. - Vax: Tdap 2021, Covid primary series and 1x booster - Requires colon-cancer screening, previously deferred to family decision - FIT testing preferred, provided testing kit today - HIV neg 2023, requires HCV testing with next lab draw - Routine labs ordered: none today  Case discussed with attending physician, Dr. Ferreira.  SANDI HUMMEL MD, PhD - PGY-3 08/05/2024

## 2024-08-05 NOTE — HISTORY OF PRESENT ILLNESS
[FreeTextEntry1] : CPE  [de-identified] : 49M hx intellectual disability/autism (non-verbal), tachy/yolande syndrome (s/p PPM).  # Tachy-yolande syndrome - Follows with cardiology, last seen 12/2023 - Upgraded to Medtronic dual chamber PPM 8/2019 - Most recent Pacemaker interrogation (Jan 2023) Atrial Pacing 45%  # Insomnia - Benadryl per psych recs, limited choice of medications given patients cardiac history - Only taking melatonin currently  # HCM - Vax: Tdap 2021, Covid primary series and 1x booster - Requires colon-cancer screening, previously deferred to family decision - FIT testing preferred, provided testing kit today - HIV neg 2023, requires HCV testing with next lab draw

## 2024-08-05 NOTE — PHYSICAL EXAM
[No Acute Distress] : no acute distress [Well Nourished] : well nourished [Well Developed] : well developed [Well-Appearing] : well-appearing [No Respiratory Distress] : no respiratory distress  [Clear to Auscultation] : lungs were clear to auscultation bilaterally [Normal Rate] : normal rate  [Regular Rhythm] : with a regular rhythm [Normal S1, S2] : normal S1 and S2 [No Varicosities] : no varicosities [No Edema] : there was no peripheral edema [Soft] : abdomen soft [Non Tender] : non-tender [Non-distended] : non-distended

## 2024-08-19 DIAGNOSIS — I49.5 SICK SINUS SYNDROME: ICD-10-CM

## 2024-08-19 DIAGNOSIS — Z00.00 ENCOUNTER FOR GENERAL ADULT MEDICAL EXAMINATION WITHOUT ABNORMAL FINDINGS: ICD-10-CM

## 2024-08-19 DIAGNOSIS — G47.00 INSOMNIA, UNSPECIFIED: ICD-10-CM

## 2024-09-16 ENCOUNTER — NON-APPOINTMENT (OUTPATIENT)
Age: 49
End: 2024-09-16

## 2024-09-17 ENCOUNTER — APPOINTMENT (OUTPATIENT)
Dept: ELECTROPHYSIOLOGY | Facility: CLINIC | Age: 49
End: 2024-09-17
Payer: MEDICAID

## 2024-09-17 PROCEDURE — 93296 REM INTERROG EVL PM/IDS: CPT

## 2024-09-17 PROCEDURE — 93294 REM INTERROG EVL PM/LDLS PM: CPT

## 2024-09-30 ENCOUNTER — LABORATORY RESULT (OUTPATIENT)
Age: 49
End: 2024-09-30

## 2024-11-06 ENCOUNTER — APPOINTMENT (OUTPATIENT)
Dept: INTERNAL MEDICINE | Facility: CLINIC | Age: 49
End: 2024-11-06
Payer: MEDICAID

## 2024-11-06 ENCOUNTER — OUTPATIENT (OUTPATIENT)
Dept: OUTPATIENT SERVICES | Facility: HOSPITAL | Age: 49
LOS: 1 days | End: 2024-11-06
Payer: MEDICAID

## 2024-11-06 VITALS
BODY MASS INDEX: 25.07 KG/M2 | WEIGHT: 156 LBS | HEART RATE: 81 BPM | SYSTOLIC BLOOD PRESSURE: 116 MMHG | HEIGHT: 66 IN | DIASTOLIC BLOOD PRESSURE: 78 MMHG | OXYGEN SATURATION: 98 %

## 2024-11-06 DIAGNOSIS — R10.814 LEFT LOWER QUADRANT ABDOMINAL TENDERNESS: ICD-10-CM

## 2024-11-06 DIAGNOSIS — I10 ESSENTIAL (PRIMARY) HYPERTENSION: ICD-10-CM

## 2024-11-06 DIAGNOSIS — R10.31 RIGHT LOWER QUADRANT PAIN: ICD-10-CM

## 2024-11-06 DIAGNOSIS — Z95.0 PRESENCE OF CARDIAC PACEMAKER: Chronic | ICD-10-CM

## 2024-11-06 PROCEDURE — 99213 OFFICE O/P EST LOW 20 MIN: CPT | Mod: GC

## 2024-11-06 PROCEDURE — G0463: CPT

## 2024-11-06 PROCEDURE — 85027 COMPLETE CBC AUTOMATED: CPT

## 2024-11-06 RX ORDER — SENNOSIDES 8.6 MG TABLETS 8.6 MG/1
8.6 TABLET ORAL
Qty: 60 | Refills: 2 | Status: ACTIVE | COMMUNITY
Start: 2024-11-06

## 2024-11-06 RX ORDER — POLYETHYLENE GLYCOL 3350 17 G/17G
17 POWDER, FOR SOLUTION ORAL DAILY
Qty: 1 | Refills: 1 | Status: ACTIVE | COMMUNITY
Start: 2024-11-06 | End: 1900-01-01

## 2024-11-08 ENCOUNTER — NON-APPOINTMENT (OUTPATIENT)
Age: 49
End: 2024-11-08

## 2024-11-08 LAB
HCT VFR BLD CALC: 43.9 %
HGB BLD-MCNC: 14.1 G/DL
MCHC RBC-ENTMCNC: 27.2 PG
MCHC RBC-ENTMCNC: 32.1 G/DL
MCV RBC AUTO: 84.7 FL
PLATELET # BLD AUTO: 207 K/UL
RBC # BLD: 5.18 M/UL
RBC # FLD: 13.8 %
WBC # FLD AUTO: 5.68 K/UL

## 2024-11-25 ENCOUNTER — APPOINTMENT (OUTPATIENT)
Dept: ULTRASOUND IMAGING | Facility: IMAGING CENTER | Age: 49
End: 2024-11-25
Payer: MEDICAID

## 2024-11-25 ENCOUNTER — OUTPATIENT (OUTPATIENT)
Dept: OUTPATIENT SERVICES | Facility: HOSPITAL | Age: 49
LOS: 1 days | End: 2024-11-25
Payer: MEDICAID

## 2024-11-25 DIAGNOSIS — R10.814 LEFT LOWER QUADRANT ABDOMINAL TENDERNESS: ICD-10-CM

## 2024-11-25 DIAGNOSIS — Z95.0 PRESENCE OF CARDIAC PACEMAKER: Chronic | ICD-10-CM

## 2024-11-25 PROCEDURE — 76705 ECHO EXAM OF ABDOMEN: CPT

## 2024-11-25 PROCEDURE — 76705 ECHO EXAM OF ABDOMEN: CPT | Mod: 26

## 2024-12-17 ENCOUNTER — APPOINTMENT (OUTPATIENT)
Dept: ELECTROPHYSIOLOGY | Facility: CLINIC | Age: 49
End: 2024-12-17

## 2024-12-17 PROCEDURE — 93296 REM INTERROG EVL PM/IDS: CPT

## 2024-12-17 PROCEDURE — 93294 REM INTERROG EVL PM/LDLS PM: CPT

## 2025-03-18 ENCOUNTER — NON-APPOINTMENT (OUTPATIENT)
Age: 50
End: 2025-03-18

## 2025-03-18 ENCOUNTER — APPOINTMENT (OUTPATIENT)
Dept: ELECTROPHYSIOLOGY | Facility: CLINIC | Age: 50
End: 2025-03-18

## 2025-03-18 PROCEDURE — 93296 REM INTERROG EVL PM/IDS: CPT

## 2025-03-18 PROCEDURE — 93294 REM INTERROG EVL PM/LDLS PM: CPT

## 2025-06-17 ENCOUNTER — NON-APPOINTMENT (OUTPATIENT)
Age: 50
End: 2025-06-17

## 2025-06-17 ENCOUNTER — APPOINTMENT (OUTPATIENT)
Dept: ELECTROPHYSIOLOGY | Facility: CLINIC | Age: 50
End: 2025-06-17

## 2025-06-17 PROCEDURE — 93296 REM INTERROG EVL PM/IDS: CPT

## 2025-06-17 PROCEDURE — 93294 REM INTERROG EVL PM/LDLS PM: CPT

## 2025-09-16 ENCOUNTER — NON-APPOINTMENT (OUTPATIENT)
Age: 50
End: 2025-09-16

## 2025-09-16 ENCOUNTER — APPOINTMENT (OUTPATIENT)
Dept: ELECTROPHYSIOLOGY | Facility: CLINIC | Age: 50
End: 2025-09-16
Payer: MEDICAID

## 2025-09-16 PROCEDURE — 93296 REM INTERROG EVL PM/IDS: CPT

## 2025-09-16 PROCEDURE — 93294 REM INTERROG EVL PM/LDLS PM: CPT
